# Patient Record
Sex: FEMALE | Race: WHITE | Employment: UNEMPLOYED | URBAN - METROPOLITAN AREA
[De-identification: names, ages, dates, MRNs, and addresses within clinical notes are randomized per-mention and may not be internally consistent; named-entity substitution may affect disease eponyms.]

---

## 2017-03-16 ENCOUNTER — OFFICE VISIT (OUTPATIENT)
Dept: FAMILY MEDICINE CLINIC | Facility: CLINIC | Age: 32
End: 2017-03-16

## 2017-03-16 VITALS
TEMPERATURE: 99 F | WEIGHT: 127.19 LBS | DIASTOLIC BLOOD PRESSURE: 64 MMHG | HEART RATE: 60 BPM | SYSTOLIC BLOOD PRESSURE: 106 MMHG | RESPIRATION RATE: 14 BRPM | BODY MASS INDEX: 19.96 KG/M2 | HEIGHT: 67 IN

## 2017-03-16 DIAGNOSIS — Z00.00 GENERAL MEDICAL EXAM: Primary | ICD-10-CM

## 2017-03-16 DIAGNOSIS — Z80.8 FAMILY HISTORY OF SKIN CANCER: ICD-10-CM

## 2017-03-16 PROCEDURE — 99395 PREV VISIT EST AGE 18-39: CPT | Performed by: FAMILY MEDICINE

## 2017-03-16 PROCEDURE — G0438 PPPS, INITIAL VISIT: HCPCS | Performed by: FAMILY MEDICINE

## 2017-03-16 NOTE — PROGRESS NOTES
HPI:   Pal Cummings is a 34year old female who presents for a complete physical exam. Sees gynecology for all breast and  care. There is no immunization history on file for this patient.   Wt Readings from Last 6 Encounters:  03/16/17 : heartburn  : denies dysuria, vaginal discharge or itching,periods regular   MUSCULOSKELETAL: denies back pain  NEURO: denies headaches  PSYCHE: denies depression or anxiety  HEMATOLOGIC: denies hx of anemia  ENDOCRINE: denies thyroid history  ALL/ASTHMA:

## 2017-03-18 ENCOUNTER — NURSE ONLY (OUTPATIENT)
Dept: FAMILY MEDICINE CLINIC | Facility: CLINIC | Age: 32
End: 2017-03-18

## 2017-03-18 DIAGNOSIS — Z00.00 GENERAL MEDICAL EXAM: ICD-10-CM

## 2017-03-18 LAB
25-HYDROXYVITAMIN D (TOTAL): 27.3 NG/ML (ref 30–100)
ALBUMIN SERPL-MCNC: 3.9 G/DL (ref 3.5–4.8)
ALP LIVER SERPL-CCNC: 39 U/L (ref 37–98)
ALT SERPL-CCNC: 17 U/L (ref 14–54)
AST SERPL-CCNC: 13 U/L (ref 15–41)
BASOPHILS # BLD AUTO: 0.03 X10(3) UL (ref 0–0.1)
BASOPHILS NFR BLD AUTO: 0.6 %
BILIRUB SERPL-MCNC: 1.8 MG/DL (ref 0.1–2)
BUN BLD-MCNC: 16 MG/DL (ref 8–20)
CALCIUM BLD-MCNC: 8.8 MG/DL (ref 8.3–10.3)
CHLORIDE: 106 MMOL/L (ref 101–111)
CHOLEST SMN-MCNC: 114 MG/DL (ref ?–200)
CO2: 29 MMOL/L (ref 22–32)
CREAT BLD-MCNC: 0.79 MG/DL (ref 0.55–1.02)
EOSINOPHIL # BLD AUTO: 0.12 X10(3) UL (ref 0–0.3)
EOSINOPHIL NFR BLD AUTO: 2.4 %
ERYTHROCYTE [DISTWIDTH] IN BLOOD BY AUTOMATED COUNT: 13.3 % (ref 11.5–16)
GLUCOSE BLD-MCNC: 80 MG/DL (ref 70–99)
HCT VFR BLD AUTO: 35.7 % (ref 34–50)
HDLC SERPL-MCNC: 52 MG/DL (ref 45–?)
HDLC SERPL: 2.19 {RATIO} (ref ?–4.44)
HGB BLD-MCNC: 12.5 G/DL (ref 12–16)
IMMATURE GRANULOCYTE COUNT: 0.02 X10(3) UL (ref 0–1)
IMMATURE GRANULOCYTE RATIO %: 0.4 %
LDLC SERPL CALC-MCNC: 51 MG/DL (ref ?–130)
LYMPHOCYTES # BLD AUTO: 1.62 X10(3) UL (ref 0.9–4)
LYMPHOCYTES NFR BLD AUTO: 32.9 %
M PROTEIN MFR SERPL ELPH: 6.5 G/DL (ref 6.1–8.3)
MCH RBC QN AUTO: 30.1 PG (ref 27–33.2)
MCHC RBC AUTO-ENTMCNC: 35 G/DL (ref 31–37)
MCV RBC AUTO: 86 FL (ref 81–100)
MONOCYTES # BLD AUTO: 0.34 X10(3) UL (ref 0.1–0.6)
MONOCYTES NFR BLD AUTO: 6.9 %
NEUTROPHIL ABS PRELIM: 2.79 X10 (3) UL (ref 1.3–6.7)
NEUTROPHILS # BLD AUTO: 2.79 X10(3) UL (ref 1.3–6.7)
NEUTROPHILS NFR BLD AUTO: 56.8 %
NONHDLC SERPL-MCNC: 62 MG/DL (ref ?–130)
PLATELET # BLD AUTO: 150 10(3)UL (ref 150–450)
POTASSIUM SERPL-SCNC: 3.9 MMOL/L (ref 3.6–5.1)
RBC # BLD AUTO: 4.15 X10(6)UL (ref 3.8–5.1)
RED CELL DISTRIBUTION WIDTH-SD: 41.3 FL (ref 35.1–46.3)
SODIUM SERPL-SCNC: 140 MMOL/L (ref 136–144)
TRIGLYCERIDES: 57 MG/DL (ref ?–150)
TSI SER-ACNC: 0.54 MIU/ML (ref 0.35–5.5)
VLDL: 11 MG/DL (ref 5–40)
WBC # BLD AUTO: 4.9 X10(3) UL (ref 4–13)

## 2017-03-18 PROCEDURE — 85025 COMPLETE CBC W/AUTO DIFF WBC: CPT | Performed by: FAMILY MEDICINE

## 2017-03-18 PROCEDURE — 80053 COMPREHEN METABOLIC PANEL: CPT | Performed by: FAMILY MEDICINE

## 2017-03-18 PROCEDURE — 84443 ASSAY THYROID STIM HORMONE: CPT | Performed by: FAMILY MEDICINE

## 2017-03-18 PROCEDURE — 82306 VITAMIN D 25 HYDROXY: CPT | Performed by: FAMILY MEDICINE

## 2017-03-18 PROCEDURE — 36415 COLL VENOUS BLD VENIPUNCTURE: CPT | Performed by: FAMILY MEDICINE

## 2017-03-18 PROCEDURE — 80061 LIPID PANEL: CPT | Performed by: FAMILY MEDICINE

## 2017-04-26 ENCOUNTER — HOSPITAL ENCOUNTER (OUTPATIENT)
Age: 32
Discharge: HOME | End: 2017-04-26
Attending: FAMILY MEDICINE
Payer: COMMERCIAL

## 2017-04-26 VITALS
OXYGEN SATURATION: 100 % | HEIGHT: 67 IN | RESPIRATION RATE: 16 BRPM | WEIGHT: 125 LBS | TEMPERATURE: 98 F | DIASTOLIC BLOOD PRESSURE: 64 MMHG | SYSTOLIC BLOOD PRESSURE: 99 MMHG | BODY MASS INDEX: 19.62 KG/M2 | HEART RATE: 80 BPM

## 2017-04-26 DIAGNOSIS — J04.0 ACUTE LARYNGITIS: ICD-10-CM

## 2017-04-26 DIAGNOSIS — J02.9 TONSILLOPHARYNGITIS: Primary | ICD-10-CM

## 2017-04-26 PROCEDURE — 99214 OFFICE O/P EST MOD 30 MIN: CPT

## 2017-04-26 PROCEDURE — 87081 CULTURE SCREEN ONLY: CPT | Performed by: FAMILY MEDICINE

## 2017-04-26 PROCEDURE — 87430 STREP A AG IA: CPT | Performed by: FAMILY MEDICINE

## 2017-04-26 RX ORDER — AMOXICILLIN AND CLAVULANATE POTASSIUM 875; 125 MG/1; MG/1
1 TABLET, FILM COATED ORAL 2 TIMES DAILY
Qty: 20 TABLET | Refills: 0 | Status: SHIPPED | OUTPATIENT
Start: 2017-04-26 | End: 2017-05-06

## 2017-04-26 NOTE — ED INITIAL ASSESSMENT (HPI)
Patient has had a sore throat for 7 days. Two days ago she lost her voice. No fevers. No congestion.

## 2017-04-26 NOTE — ED PROVIDER NOTES
Patient Seen in: [address] [facility] [place] [facility]    History   Patient presents with:  Sore Throat  Laryngitis    Stated Complaint: SORE THROAT X 7 DAYS    HPI    34-year-old female coming in with complaints of sore throat for the last 7 days, 2 days is l Temporal   SpO2 04/26/17 0836 100 %   O2 Device 04/26/17 0836 None (Room air)       Current:BP 99/64 mmHg  Pulse 80  Temp(Src) 98.1 °F (36.7 °C) (Temporal)  Resp 16  Ht 170.2 cm (5' 7\")  Wt 56.7 kg  BMI 19.57 kg/m2  SpO2 100%  LMP 04/02/2017 (Exact Date) complications and if so please go to the ER       Disposition and Plan     Clinical Impression:  Tonsillopharyngitis  (primary encounter diagnosis)  Acute laryngitis    Disposition:  Discharge    Follow-up:  Tao Edmond, [address] [address]

## 2017-07-02 ENCOUNTER — HOSPITAL ENCOUNTER (OUTPATIENT)
Age: 32
Discharge: HOME | End: 2017-07-02
Attending: FAMILY MEDICINE
Payer: COMMERCIAL

## 2017-07-02 VITALS
HEART RATE: 75 BPM | DIASTOLIC BLOOD PRESSURE: 98 MMHG | SYSTOLIC BLOOD PRESSURE: 127 MMHG | OXYGEN SATURATION: 100 % | RESPIRATION RATE: 16 BRPM | TEMPERATURE: 98 F

## 2017-07-02 DIAGNOSIS — R39.9 UTI SYMPTOMS: Primary | ICD-10-CM

## 2017-07-02 LAB
POCT BILIRUBIN URINE: NEGATIVE
POCT BLOOD URINE: NEGATIVE
POCT GLUCOSE URINE: NEGATIVE MG/DL
POCT KETONE URINE: NEGATIVE MG/DL
POCT NITRITE URINE: NEGATIVE
POCT PH URINE: 5.5 (ref 5–8)
POCT PROTEIN URINE: NEGATIVE MG/DL
POCT SPECIFIC GRAVITY URINE: 1.01
POCT URINE CLARITY: CLEAR
POCT URINE COLOR: YELLOW
POCT URINE PREGNANCY: NEGATIVE
POCT UROBILINOGEN URINE: 0.2 MG/DL

## 2017-07-02 PROCEDURE — 87086 URINE CULTURE/COLONY COUNT: CPT | Performed by: FAMILY MEDICINE

## 2017-07-02 PROCEDURE — 81002 URINALYSIS NONAUTO W/O SCOPE: CPT | Performed by: FAMILY MEDICINE

## 2017-07-02 PROCEDURE — 81025 URINE PREGNANCY TEST: CPT | Performed by: FAMILY MEDICINE

## 2017-07-02 PROCEDURE — 99214 OFFICE O/P EST MOD 30 MIN: CPT

## 2017-07-02 RX ORDER — NITROFURANTOIN 25; 75 MG/1; MG/1
100 CAPSULE ORAL 2 TIMES DAILY
Qty: 14 CAPSULE | Refills: 0 | Status: SHIPPED | OUTPATIENT
Start: 2017-07-02 | End: 2017-07-05

## 2017-07-02 RX ORDER — PHENAZOPYRIDINE HYDROCHLORIDE 200 MG/1
200 TABLET, FILM COATED ORAL 3 TIMES DAILY PRN
Qty: 6 TABLET | Refills: 0 | Status: SHIPPED | OUTPATIENT
Start: 2017-07-02 | End: 2017-07-04

## 2017-07-02 NOTE — ED PROVIDER NOTES
[place] [address] [facility] [place]   Patient presents with:  Urinary Symptoms (urologic)    Stated Complaint: painful urination    HPI    48-year-old female presents to clinic today with chief complaints of dysuria and suprapubic reviewed from today and agreed except as otherwise stated in HPI.     Physical Exam   ED Triage Vitals [07/02/17 1404]  BP: 127/98  Pulse: 75  Resp: 16  Temp: (!) 97.5 °F (36.4 °C)  Temp src: Temporal  SpO2: 100 %  O2 Device: None (Room air)    Current:BP 1 Clinical Impression:  UTI symptoms  (primary encounter diagnosis)    Disposition:  Discharge    Follow-up:  Gemini Valle, [address] [place] [address]  [phone]    In 1 week  For re-check      Medications Prescribed:  Current Discharge

## 2017-07-02 NOTE — ED INITIAL ASSESSMENT (HPI)
Painful urination and pressure for the past two days. Concerned with UTI. Burning with urination as well.

## 2017-09-19 ENCOUNTER — OFFICE VISIT (OUTPATIENT)
Dept: FAMILY MEDICINE CLINIC | Facility: CLINIC | Age: 32
End: 2017-09-19

## 2017-09-19 VITALS
OXYGEN SATURATION: 98 % | SYSTOLIC BLOOD PRESSURE: 110 MMHG | BODY MASS INDEX: 21 KG/M2 | HEART RATE: 92 BPM | TEMPERATURE: 99 F | DIASTOLIC BLOOD PRESSURE: 70 MMHG | WEIGHT: 128.31 LBS

## 2017-09-19 DIAGNOSIS — R39.15 URINARY URGENCY: ICD-10-CM

## 2017-09-19 DIAGNOSIS — R30.9 URINARY PAIN: Primary | ICD-10-CM

## 2017-09-19 LAB
BILIRUBIN: NEGATIVE
GLUCOSE (URINE DIPSTICK): NEGATIVE MG/DL
KETONES (URINE DIPSTICK): NEGATIVE MG/DL
LEUKOCYTES: NEGATIVE
MULTISTIX LOT#: NORMAL NUMERIC
NITRITE, URINE: NEGATIVE
OCCULT BLOOD: NEGATIVE
PH, URINE: 7 (ref 4.5–8)
PROTEIN (URINE DIPSTICK): NEGATIVE MG/DL
SPECIFIC GRAVITY: 1.02 (ref 1–1.03)
URINE-COLOR: YELLOW
UROBILINOGEN,SEMI-QN: 0.2 MG/DL (ref 0–1.9)

## 2017-09-19 PROCEDURE — 81003 URINALYSIS AUTO W/O SCOPE: CPT | Performed by: FAMILY MEDICINE

## 2017-09-19 PROCEDURE — 99213 OFFICE O/P EST LOW 20 MIN: CPT | Performed by: FAMILY MEDICINE

## 2017-09-19 NOTE — PROGRESS NOTES
CC: urinary/gu symptoms    HPI:     Pt had recent dysuria and pelvic/vaginal pressure burning. Went to IC, tx with abx and cx negative abx stopped. Went to gynecologist but nothing found on pelvic u/s or vaginal testing. This is intermittent for 5 months.

## 2017-09-21 ENCOUNTER — TELEPHONE (OUTPATIENT)
Dept: FAMILY MEDICINE CLINIC | Facility: CLINIC | Age: 32
End: 2017-09-21

## 2017-09-21 NOTE — TELEPHONE ENCOUNTER
Pt called and made and appt with the Urogynecology but the code for her insurance needs to be changed on the referral   Code is [id number]  Please return call to [phone]

## 2017-10-31 ENCOUNTER — OFFICE VISIT (OUTPATIENT)
Dept: UROLOGY | Facility: HOSPITAL | Age: 32
End: 2017-10-31
Attending: OBSTETRICS & GYNECOLOGY
Payer: COMMERCIAL

## 2017-10-31 VITALS
SYSTOLIC BLOOD PRESSURE: 116 MMHG | WEIGHT: 128 LBS | BODY MASS INDEX: 20.57 KG/M2 | DIASTOLIC BLOOD PRESSURE: 70 MMHG | HEIGHT: 66 IN

## 2017-10-31 DIAGNOSIS — M62.838 LEVATOR SPASM: ICD-10-CM

## 2017-10-31 DIAGNOSIS — R35.0 FREQUENCY OF URINATION: ICD-10-CM

## 2017-10-31 DIAGNOSIS — N81.6 RECTOCELE: ICD-10-CM

## 2017-10-31 DIAGNOSIS — N81.84 PELVIC MUSCLE WASTING: Primary | ICD-10-CM

## 2017-10-31 PROCEDURE — 87086 URINE CULTURE/COLONY COUNT: CPT | Performed by: OBSTETRICS & GYNECOLOGY

## 2017-10-31 PROCEDURE — 81002 URINALYSIS NONAUTO W/O SCOPE: CPT

## 2017-10-31 PROCEDURE — 99201 HC OUTPT EVAL AND MGNT NEW PT LEVEL 1: CPT

## 2017-10-31 NOTE — PROGRESS NOTES
Huey Wild,   10/31/2017     Referred by Dr. Liz Pryor    Patient presents with:  Pelvic Pain: Referred by Dr Frank Carreno. itching, burning before period,pressure and pain with intercourse.     She reports vaginal itching, pelvic pressure, suprapubic CP  No SOB  No s/sx of UTI today    GENERAL EXAM:  GENERAL:  Alert and Oriented, and NAD  HEENT:  Normal, no lesions  LUNGS:  Normal effort  HEART:  RRR  ABDOMEN: soft, no mass, no hernia  EXTREM:  Normal, no edema  SKIN:  Normal, no lesions    PELVIC EXAM Items:  urine testing    Medications Discussed:  OAB meds    Treatment Plan, Non-surgical:   bladder diet/drill, pelvic floor PT    Treatment Plan, Surgical:   None    Pt verbalizes understanding of all above discussed information    Return in about 3 artur

## 2017-11-02 ENCOUNTER — TELEPHONE (OUTPATIENT)
Dept: UROLOGY | Facility: HOSPITAL | Age: 32
End: 2017-11-02

## 2018-03-13 ENCOUNTER — TELEPHONE (OUTPATIENT)
Dept: FAMILY MEDICINE CLINIC | Facility: CLINIC | Age: 33
End: 2018-03-13

## 2018-03-13 NOTE — TELEPHONE ENCOUNTER
Pt wants to discuss lab order. Current specialist does not take insurance. Can Dr. Ifeoma Tapia put in order for these so they go thru insurance or does she need appointment? Mauricio Nguyen, hormone specialist she sees.

## 2018-06-15 ENCOUNTER — OFFICE VISIT (OUTPATIENT)
Dept: FAMILY MEDICINE CLINIC | Facility: CLINIC | Age: 33
End: 2018-06-15

## 2018-06-15 VITALS
RESPIRATION RATE: 20 BRPM | DIASTOLIC BLOOD PRESSURE: 64 MMHG | HEIGHT: 66.9 IN | BODY MASS INDEX: 20.31 KG/M2 | HEART RATE: 92 BPM | TEMPERATURE: 97 F | SYSTOLIC BLOOD PRESSURE: 100 MMHG | WEIGHT: 129.38 LBS

## 2018-06-15 DIAGNOSIS — Z00.00 GENERAL MEDICAL EXAM: Primary | ICD-10-CM

## 2018-06-15 PROCEDURE — 99395 PREV VISIT EST AGE 18-39: CPT | Performed by: FAMILY MEDICINE

## 2018-06-15 NOTE — PROGRESS NOTES
HPI:   Stephanie Cortés is a 28year old female who presents for a complete physical exam. Sees Dr. Pedro Guerra for all breast and  care. Gets some Raynauds like symptoms in the hands and feet.       There is no immunization history on file for t with exertion  CARDIOVASCULAR: denies chest pain on exertion  GI: denies abdominal pain,denies heartburn  : denies dysuria, vaginal discharge or itching,periods regular   MUSCULOSKELETAL: denies back pain  NEURO: denies headaches  PSYCHE: anxiety better

## 2018-06-19 ENCOUNTER — NURSE ONLY (OUTPATIENT)
Dept: FAMILY MEDICINE CLINIC | Facility: CLINIC | Age: 33
End: 2018-06-19

## 2018-06-19 DIAGNOSIS — Z00.00 GENERAL MEDICAL EXAM: Primary | ICD-10-CM

## 2018-06-19 PROCEDURE — 80061 LIPID PANEL: CPT | Performed by: FAMILY MEDICINE

## 2018-06-19 PROCEDURE — 80053 COMPREHEN METABOLIC PANEL: CPT | Performed by: FAMILY MEDICINE

## 2018-06-19 PROCEDURE — 82306 VITAMIN D 25 HYDROXY: CPT | Performed by: FAMILY MEDICINE

## 2018-06-19 PROCEDURE — 36415 COLL VENOUS BLD VENIPUNCTURE: CPT | Performed by: FAMILY MEDICINE

## 2018-06-19 PROCEDURE — 85025 COMPLETE CBC W/AUTO DIFF WBC: CPT | Performed by: FAMILY MEDICINE

## 2018-06-19 PROCEDURE — 84443 ASSAY THYROID STIM HORMONE: CPT | Performed by: FAMILY MEDICINE

## 2018-08-15 ENCOUNTER — LAB ENCOUNTER (OUTPATIENT)
Dept: LAB | Age: 33
End: 2018-08-15
Attending: OBSTETRICS & GYNECOLOGY
Payer: COMMERCIAL

## 2018-08-15 DIAGNOSIS — N94.3 PMS (PREMENSTRUAL SYNDROME): ICD-10-CM

## 2018-08-15 DIAGNOSIS — R45.86 MOOD CHANGES: ICD-10-CM

## 2018-08-15 LAB
ESTRADIOL: 73.9 PG/ML
FSH SERPL-ACNC: 7.1 MIU/ML
PROLACTIN: 4.3 NG/ML
TSI SER-ACNC: 0.67 MIU/ML (ref 0.35–5.5)

## 2018-08-15 PROCEDURE — 83001 ASSAY OF GONADOTROPIN (FSH): CPT

## 2018-08-15 PROCEDURE — 84146 ASSAY OF PROLACTIN: CPT

## 2018-08-15 PROCEDURE — 84443 ASSAY THYROID STIM HORMONE: CPT

## 2018-08-15 PROCEDURE — 36415 COLL VENOUS BLD VENIPUNCTURE: CPT

## 2018-08-15 PROCEDURE — 82670 ASSAY OF TOTAL ESTRADIOL: CPT

## 2018-10-22 ENCOUNTER — HOSPITAL ENCOUNTER (OUTPATIENT)
Dept: MAMMOGRAPHY | Age: 33
Discharge: HOME | End: 2018-10-22
Attending: OBSTETRICS & GYNECOLOGY
Payer: COMMERCIAL

## 2018-10-22 ENCOUNTER — HOSPITAL ENCOUNTER (OUTPATIENT)
Dept: ULTRASOUND IMAGING | Age: 33
Discharge: HOME | End: 2018-10-22
Attending: OBSTETRICS & GYNECOLOGY
Payer: COMMERCIAL

## 2018-10-22 DIAGNOSIS — N64.4 BREAST PAIN: ICD-10-CM

## 2018-10-22 PROCEDURE — 76641 ULTRASOUND BREAST COMPLETE: CPT | Performed by: OBSTETRICS & GYNECOLOGY

## 2018-10-22 PROCEDURE — 77066 DX MAMMO INCL CAD BI: CPT | Performed by: OBSTETRICS & GYNECOLOGY

## 2018-10-22 PROCEDURE — 77062 BREAST TOMOSYNTHESIS BI: CPT | Performed by: OBSTETRICS & GYNECOLOGY

## 2019-02-12 ENCOUNTER — OFFICE VISIT (OUTPATIENT)
Dept: OBGYN CLINIC | Facility: CLINIC | Age: 34
End: 2019-02-12

## 2019-02-12 VITALS
HEIGHT: 65.25 IN | DIASTOLIC BLOOD PRESSURE: 68 MMHG | WEIGHT: 129 LBS | SYSTOLIC BLOOD PRESSURE: 110 MMHG | BODY MASS INDEX: 21.23 KG/M2

## 2019-02-12 DIAGNOSIS — N90.89 VULVAR IRRITATION: ICD-10-CM

## 2019-02-12 DIAGNOSIS — N64.4 BREAST PAIN: ICD-10-CM

## 2019-02-12 DIAGNOSIS — N94.3 PMS (PREMENSTRUAL SYNDROME): ICD-10-CM

## 2019-02-12 DIAGNOSIS — Z01.419 WELL FEMALE EXAM WITH ROUTINE GYNECOLOGICAL EXAM: Primary | ICD-10-CM

## 2019-02-12 DIAGNOSIS — Z12.4 CERVICAL CANCER SCREENING: ICD-10-CM

## 2019-02-12 DIAGNOSIS — R45.86 MOOD CHANGES: ICD-10-CM

## 2019-02-12 PROCEDURE — 88175 CYTOPATH C/V AUTO FLUID REDO: CPT | Performed by: OBSTETRICS & GYNECOLOGY

## 2019-02-12 PROCEDURE — 87624 HPV HI-RISK TYP POOLED RSLT: CPT | Performed by: OBSTETRICS & GYNECOLOGY

## 2019-02-12 PROCEDURE — 99385 PREV VISIT NEW AGE 18-39: CPT | Performed by: OBSTETRICS & GYNECOLOGY

## 2019-02-12 RX ORDER — NYSTATIN AND TRIAMCINOLONE ACETONIDE 100000; 1 [USP'U]/G; MG/G
OINTMENT TOPICAL
Qty: 30 G | Refills: 2 | Status: SHIPPED | OUTPATIENT
Start: 2019-02-12 | End: 2019-12-03

## 2019-02-12 NOTE — PROGRESS NOTES
GYN H&P     2019  3:00 PM    CC: Patient is here for annual and multiple issues    HPI: patient is a 28year old  here for her annual gyn exam.   She has issues with breast pain, vulvar pain, pelvic pressure, pms.  All have been evaluated by her Years of education: Not on file      Highest education level: Not on file    Social Needs      Financial resource strain: Not on file      Food insecurity - worry: Not on file      Food insecurity - inability: Not on file      Transportation needs - medica anxiety  Endocrine: denies hot or cold intolerance, mood changes,   Neurological: denies changes in sight, smell, hearing or taste.  Denies seizures or tremors  Immunological: denies allergies, denies anaphylaxis, or swollen lymph nodes  Musculoskeletal: de HIGH RISK , THIN PREP COLLECTION; Future  - HPV HIGH RISK , THIN PREP COLLECTION  - THINPREP PAP SMEAR B    3. PMS (premenstrual syndrome)  Info given on options    4. Breast pain  Had neg imaging lately, given Gresiks name if desires    5.  Vulvar irritati

## 2019-02-12 NOTE — PATIENT INSTRUCTIONS
Breast Health: Breast Self-Awareness  What is breast self-awareness? Breast self-awareness is knowing how your breasts normally look and feel. Your breasts change as you go through different stages of your life.  So it’s important to learn what is normal normal to be upset if you find a lump. But it’s important to contact your provider right away. Remember that most breast lumps are benign. This means they are not cancer. Date Last Reviewed: 8/1/2017  © 2945-3048 [employer].  [facility] source of energy. To help keep energy and serotonin levels steady, eat small amounts throughout the day. High-fiber carbs include:  · Whole-grain foods. Brown rice, whole-wheat pasta, whole-grain bread, and buckwheat noodles are good choices.   · Fresh frui swimming, or yoga. ? Exercise for 30 minutes, most days of the week. If this seems like too much, start with 10 minutes a day and work your way up. ? Find ways to fit activity into your day. Try taking the stairs instead of the elevator.   Sleeping well as a substitute for professional medical care. Always follow your healthcare professional's instructions. Managing PMS: Medications  With your healthcare provider’s help, you may find a medicine that works for you.  Make sure you know how to use your headache. SSRIs may also reduce interest in sex. This is less likely when SSRIs are taken only for part of the menstrual cycle. · Other concerns. SSRIs shouldn’t be used if you take certain antidepressants.   Guerraview agonists  Guerraview agonists may be used in sev

## 2019-02-13 LAB — HPV I/H RISK 1 DNA SPEC QL NAA+PROBE: NEGATIVE

## 2019-03-25 ENCOUNTER — HOSPITAL ENCOUNTER (OUTPATIENT)
Dept: GENERAL RADIOLOGY | Age: 34
Discharge: HOME | End: 2019-03-25
Attending: FAMILY MEDICINE
Payer: COMMERCIAL

## 2019-03-25 ENCOUNTER — OFFICE VISIT (OUTPATIENT)
Dept: FAMILY MEDICINE CLINIC | Facility: CLINIC | Age: 34
End: 2019-03-25

## 2019-03-25 VITALS
TEMPERATURE: 98 F | WEIGHT: 129.38 LBS | RESPIRATION RATE: 12 BRPM | SYSTOLIC BLOOD PRESSURE: 108 MMHG | HEART RATE: 84 BPM | DIASTOLIC BLOOD PRESSURE: 70 MMHG | OXYGEN SATURATION: 98 % | BODY MASS INDEX: 21 KG/M2

## 2019-03-25 DIAGNOSIS — M89.8X9 EXOSTOSIS: ICD-10-CM

## 2019-03-25 DIAGNOSIS — M89.8X9 EXOSTOSIS: Primary | ICD-10-CM

## 2019-03-25 PROCEDURE — 73560 X-RAY EXAM OF KNEE 1 OR 2: CPT | Performed by: FAMILY MEDICINE

## 2019-03-25 PROCEDURE — 99213 OFFICE O/P EST LOW 20 MIN: CPT | Performed by: FAMILY MEDICINE

## 2019-03-25 RX ORDER — CHOLECALCIFEROL (VITAMIN D3) 50 MCG
TABLET ORAL DAILY
COMMUNITY
End: 2019-12-03

## 2019-03-25 NOTE — PROGRESS NOTES
CC: knee    HPI:     Location left sided medial  Quality lump  Severity moderate  Duration several months  Context no known injury  Modifying factors worse at night  Associated symptoms no bruising    ROS: no edema    EXAM:   Blood pressure 108/70, pulse 8

## 2019-03-30 ENCOUNTER — PATIENT MESSAGE (OUTPATIENT)
Dept: FAMILY MEDICINE CLINIC | Facility: CLINIC | Age: 34
End: 2019-03-30

## 2019-03-30 DIAGNOSIS — M79.9 SOFT TISSUE LESION OF KNEE REGION: Primary | ICD-10-CM

## 2019-03-30 DIAGNOSIS — D49.2 NEOPLASM OF SKIN: ICD-10-CM

## 2019-03-30 NOTE — TELEPHONE ENCOUNTER
Jaclyn Ruffin RN 3/30/2019 10:06 AM CDT        ----- Message -----  From: Ligia Keating  Sent: 3/30/2019 10:02 AM  To: [facility] Clinical Staff  Subject: Era Terry     ----- Message from [address] [place] [address] [address] Generic sent at 3/30/2019 10:02 AM CDT -----    Barbara Jaimes  U

## 2019-04-08 ENCOUNTER — HOSPITAL ENCOUNTER (OUTPATIENT)
Dept: ULTRASOUND IMAGING | Facility: HOSPITAL | Age: 34
Discharge: HOME | End: 2019-04-08
Attending: FAMILY MEDICINE
Payer: COMMERCIAL

## 2019-04-08 DIAGNOSIS — M79.9 SOFT TISSUE LESION OF KNEE REGION: ICD-10-CM

## 2019-04-08 PROCEDURE — 76882 US LMTD JT/FCL EVL NVASC XTR: CPT | Performed by: FAMILY MEDICINE

## 2019-04-09 ENCOUNTER — TELEPHONE (OUTPATIENT)
Dept: FAMILY MEDICINE CLINIC | Facility: CLINIC | Age: 34
End: 2019-04-09

## 2019-04-09 DIAGNOSIS — M25.869 CYST OF KNEE JOINT, UNSPECIFIED LATERALITY: Primary | ICD-10-CM

## 2019-06-04 ENCOUNTER — HOSPITAL ENCOUNTER (OUTPATIENT)
Dept: MRI IMAGING | Age: 34
Discharge: HOME | End: 2019-06-04
Attending: ORTHOPAEDIC SURGERY
Payer: COMMERCIAL

## 2019-06-04 DIAGNOSIS — M25.562 ACUTE PAIN OF LEFT KNEE: ICD-10-CM

## 2019-06-04 PROCEDURE — 73721 MRI JNT OF LWR EXTRE W/O DYE: CPT | Performed by: ORTHOPAEDIC SURGERY

## 2019-08-05 PROBLEM — Z98.890 S/P ARTHROSCOPY OF LEFT KNEE: Status: ACTIVE | Noted: 2019-08-05

## 2019-12-02 ENCOUNTER — TELEPHONE (OUTPATIENT)
Dept: OBGYN CLINIC | Facility: CLINIC | Age: 34
End: 2019-12-02

## 2019-12-02 NOTE — TELEPHONE ENCOUNTER
27year old patient complaining of right breast pain. Has has this before; it went away for awhile, but now has returned over the last month or so. States it feels like a bruised pain. No change in self breast exam; no lumps. LMP: 2 wks ago.  Feels it goes

## 2019-12-03 ENCOUNTER — OFFICE VISIT (OUTPATIENT)
Dept: OBGYN CLINIC | Facility: CLINIC | Age: 34
End: 2019-12-03

## 2019-12-03 VITALS
SYSTOLIC BLOOD PRESSURE: 104 MMHG | WEIGHT: 131 LBS | DIASTOLIC BLOOD PRESSURE: 70 MMHG | HEART RATE: 76 BPM | BODY MASS INDEX: 21 KG/M2

## 2019-12-03 DIAGNOSIS — N64.4 BREAST PAIN: ICD-10-CM

## 2019-12-03 DIAGNOSIS — N92.6 IRREGULAR MENSES: Primary | ICD-10-CM

## 2019-12-03 DIAGNOSIS — N60.01 BREAST CYST, RIGHT: ICD-10-CM

## 2019-12-03 PROCEDURE — 99213 OFFICE O/P EST LOW 20 MIN: CPT | Performed by: NURSE PRACTITIONER

## 2019-12-03 RX ORDER — MULTIVITAMIN
TABLET ORAL
COMMUNITY

## 2019-12-03 NOTE — PROGRESS NOTES
Gyne note     S: patient is a 27year old yo  here for continued right sided breast pain. It is intermittent going on over a year. Last imaging revealed 2 small cysts and a lymph node all benign in appearance.  She notes lately she thinks her breast

## 2020-02-17 ENCOUNTER — TELEPHONE (OUTPATIENT)
Dept: FAMILY MEDICINE CLINIC | Facility: CLINIC | Age: 35
End: 2020-02-17

## 2020-02-17 ENCOUNTER — TELEPHONE (OUTPATIENT)
Dept: OBGYN CLINIC | Facility: CLINIC | Age: 35
End: 2020-02-17

## 2020-02-17 DIAGNOSIS — N64.4 PAIN OF BOTH BREASTS: Primary | ICD-10-CM

## 2020-02-17 DIAGNOSIS — D49.2 NEOPLASM OF SKIN: Primary | ICD-10-CM

## 2020-02-17 NOTE — TELEPHONE ENCOUNTER
Diagnostic US order requested/ possibly not just R side /     Please call to discuss or put in order for diagnostic US

## 2020-02-17 NOTE — TELEPHONE ENCOUNTER
LMTCB. Dermatology referral signed - she can schedule anytime. Please notify pt she is due for an annual exam with MY and schedule. Thank you.

## 2020-02-17 NOTE — TELEPHONE ENCOUNTER
Pt last seen 12/3/19; right mammogram was ordered for breast pain. Pt has not had done yet; one of her children is having some health issues.   Pt states she is now feeling some \"heaviness\" and discomfort on the left side as well; upper outer breast and

## 2020-02-19 NOTE — TELEPHONE ENCOUNTER
Order placed for bilateral diagnostic mammogram.  Patient notified. Patient verbalized understanding.

## 2020-03-06 ENCOUNTER — HOSPITAL ENCOUNTER (OUTPATIENT)
Dept: MAMMOGRAPHY | Facility: HOSPITAL | Age: 35
Discharge: HOME | End: 2020-03-06
Attending: NURSE PRACTITIONER
Payer: COMMERCIAL

## 2020-03-06 ENCOUNTER — HOSPITAL ENCOUNTER (OUTPATIENT)
Dept: MAMMOGRAPHY | Age: 35
Discharge: HOME | End: 2020-03-06
Attending: NURSE PRACTITIONER
Payer: COMMERCIAL

## 2020-03-06 ENCOUNTER — HOSPITAL ENCOUNTER (OUTPATIENT)
Dept: ULTRASOUND IMAGING | Age: 35
Discharge: HOME | End: 2020-03-06
Attending: NURSE PRACTITIONER
Payer: COMMERCIAL

## 2020-03-06 DIAGNOSIS — N64.4 PAIN OF BOTH BREASTS: ICD-10-CM

## 2020-03-06 PROCEDURE — 77066 DX MAMMO INCL CAD BI: CPT | Performed by: NURSE PRACTITIONER

## 2020-03-06 PROCEDURE — 77062 BREAST TOMOSYNTHESIS BI: CPT | Performed by: NURSE PRACTITIONER

## 2020-03-06 PROCEDURE — 76642 ULTRASOUND BREAST LIMITED: CPT | Performed by: NURSE PRACTITIONER

## 2020-03-10 ENCOUNTER — OFFICE VISIT (OUTPATIENT)
Dept: OBGYN CLINIC | Facility: CLINIC | Age: 35
End: 2020-03-10

## 2020-03-10 VITALS
SYSTOLIC BLOOD PRESSURE: 98 MMHG | DIASTOLIC BLOOD PRESSURE: 64 MMHG | WEIGHT: 130 LBS | HEIGHT: 66.25 IN | BODY MASS INDEX: 20.89 KG/M2

## 2020-03-10 DIAGNOSIS — Z01.419 WELL FEMALE EXAM WITH ROUTINE GYNECOLOGICAL EXAM: Primary | ICD-10-CM

## 2020-03-10 DIAGNOSIS — N64.4 PAIN OF BOTH BREASTS: ICD-10-CM

## 2020-03-10 DIAGNOSIS — Z12.4 CERVICAL CANCER SCREENING: ICD-10-CM

## 2020-03-10 PROCEDURE — 87624 HPV HI-RISK TYP POOLED RSLT: CPT | Performed by: OBSTETRICS & GYNECOLOGY

## 2020-03-10 PROCEDURE — 88175 CYTOPATH C/V AUTO FLUID REDO: CPT | Performed by: OBSTETRICS & GYNECOLOGY

## 2020-03-10 PROCEDURE — 99395 PREV VISIT EST AGE 18-39: CPT | Performed by: OBSTETRICS & GYNECOLOGY

## 2020-03-10 PROCEDURE — G0438 PPPS, INITIAL VISIT: HCPCS | Performed by: OBSTETRICS & GYNECOLOGY

## 2020-03-10 NOTE — PROGRESS NOTES
GYN H&P     3/10/2020  11:03 AM    CC: Patient is here for annual    HPI: patient is a 38year old  here for her annual gyn exam.   She has complaints of breast pain, all imaging negative.  Also has some vulvar discomfort but is using steroid cream o file    Occupational History      Not on file    Social Needs      Financial resource strain: Not on file      Food insecurity:        Worry: Not on file        Inability: Not on file      Transportation needs:        Medical: Not on file        Non-medica epistaxis, throat pain or sore throat  Respiratory: denies SOB, dyspnea, cough or wheezing  Cardiovascular: denies chest pain, palpitations, exercise intolerance   GI: denies abdominal pain, diarrhea, constipation  :complaints as above, denies dysuria, i edema        A/P: Patient is 38year old female with no complaints. Here for well woman exam.   1. Well female exam with routine gynecological exam    2. Cervical cancer screening    3.  Pain of both breasts        Patient counseled on diet/exercise       1

## 2020-03-11 LAB — HPV I/H RISK 1 DNA SPEC QL NAA+PROBE: NEGATIVE

## 2020-05-05 ENCOUNTER — VIRTUAL PHONE E/M (OUTPATIENT)
Dept: FAMILY MEDICINE CLINIC | Facility: CLINIC | Age: 35
End: 2020-05-05

## 2020-05-05 ENCOUNTER — TELEPHONE (OUTPATIENT)
Dept: FAMILY MEDICINE CLINIC | Facility: CLINIC | Age: 35
End: 2020-05-05

## 2020-05-05 DIAGNOSIS — N30.01 ACUTE CYSTITIS WITH HEMATURIA: Primary | ICD-10-CM

## 2020-05-05 PROCEDURE — 99213 OFFICE O/P EST LOW 20 MIN: CPT | Performed by: FAMILY MEDICINE

## 2020-05-05 RX ORDER — CIPROFLOXACIN 500 MG/1
500 TABLET, FILM COATED ORAL 2 TIMES DAILY
Qty: 10 TABLET | Refills: 0 | Status: SHIPPED | OUTPATIENT
Start: 2020-05-05 | End: 2020-05-10

## 2020-05-05 RX ORDER — PHENAZOPYRIDINE HYDROCHLORIDE 200 MG/1
200 TABLET, FILM COATED ORAL 3 TIMES DAILY PRN
Qty: 6 TABLET | Refills: 0 | Status: SHIPPED | OUTPATIENT
Start: 2020-05-05 | End: 2020-05-07

## 2020-05-05 NOTE — PROGRESS NOTES
Virtual Telephone Check-In    Jessica Singh verbally consents to a Virtual/Telephone Check-In visit on 05/05/20.     Patient understands and accepts financial responsibility for any deductible, co-insurance and/or co-pays associated with this serv

## 2020-05-05 NOTE — TELEPHONE ENCOUNTER
Patient's  advised. Verbalized understanding. Cristian Susy verbally consents to a Virtual/Telephone Check-In service on 5/5/20.   Patient understands and accepts financial responsibility for any deductible, co-insurance and/or co-pays

## 2020-05-05 NOTE — TELEPHONE ENCOUNTER
Pt  called, states pt has a UTI, used an AZO strip and it came back positive, pt has been in bed with a lot of pain, and has blood in her urine,  informed Spouse Dr Kerri Soares want to have a telemed visit.   Wants to know if MY can call in a RX

## 2020-05-05 NOTE — TELEPHONE ENCOUNTER
Call from spouse. UTI symptoms started 2 days ago. Pain in bladder area. Blood in urine. No back pain. Frequency for urination. No fever. Did at home Azo strips and they indicated a UTI. Took motrin for pain.  can bring in urine sample.    States

## 2020-05-06 ENCOUNTER — TELEPHONE (OUTPATIENT)
Dept: FAMILY MEDICINE CLINIC | Facility: CLINIC | Age: 35
End: 2020-05-06

## 2020-05-06 NOTE — TELEPHONE ENCOUNTER
Call from . Patient had tele visit yesterday for UTI. States patient feeling better overall today, except having possible reaction to Cirpo 500mg. States she has taken 2 doses. States she is nauseous, has headache and both of her arms ache.   No fev

## 2020-05-06 NOTE — TELEPHONE ENCOUNTER
Talked with patient. She reports no blood in urine and pressure like symptoms gone after drinking enough water before even taking cipro. No back pain no fever. She has nausea and headache and bodyache with cipro. No lip/tongue swelling. No sob. No hives.

## 2020-05-06 NOTE — TELEPHONE ENCOUNTER
Pt  called, states Dr Marquita Burris prescribed antibiotic for pt yesterday, and states she has a headache and is sick to her stomach and her arms are aching.

## 2020-05-08 ENCOUNTER — TELEPHONE (OUTPATIENT)
Dept: FAMILY MEDICINE CLINIC | Facility: CLINIC | Age: 35
End: 2020-05-08

## 2020-05-08 RX ORDER — SULFAMETHOXAZOLE AND TRIMETHOPRIM 800; 160 MG/1; MG/1
1 TABLET ORAL 2 TIMES DAILY
Qty: 14 TABLET | Refills: 0 | Status: SHIPPED | OUTPATIENT
Start: 2020-05-08 | End: 2020-05-15

## 2020-05-08 NOTE — TELEPHONE ENCOUNTER
Stopped taking the antibiotic, pt states she has been drinking more water, states she no longer is urinating blood, however,   Now she has pain after she urinates, Pt took an azo strip, states urinated white blood cells.   Please advise

## 2020-05-08 NOTE — TELEPHONE ENCOUNTER
Honorio Diggs, DO  You 1 minute ago (4:16 PM)     Call tell new rx sent. Try that if she thinks she still has infection.

## 2020-05-08 NOTE — TELEPHONE ENCOUNTER
Spoke to patient. Had telephone visit on 5/5/2 for hematuria. Was put on cipro but had to stop taking on 5/6/20 due to muscle cramping, headache, and dizziness. Patient has been drinking a lot of water.    No longer has blood in urine but it burns after

## 2020-05-21 ENCOUNTER — TELEPHONE (OUTPATIENT)
Dept: FAMILY MEDICINE CLINIC | Facility: CLINIC | Age: 35
End: 2020-05-21

## 2020-05-21 DIAGNOSIS — R30.9 URINARY PAIN: Primary | ICD-10-CM

## 2020-05-21 NOTE — TELEPHONE ENCOUNTER
Call tell stop testing herself. Only if symptoms develop, then we will see her in the office for testing.

## 2020-05-22 NOTE — TELEPHONE ENCOUNTER
Spoke to patient. Patient advised. Verbalized understanding. Patient keeps having a dull pressure after she urinates that happens intermittently. She is wondering if she should see urogynecologist again?  She saw Dr. Isai Lopez a couple years ago and was

## 2020-05-28 ENCOUNTER — PATIENT OUTREACH (OUTPATIENT)
Dept: FAMILY MEDICINE CLINIC | Facility: CLINIC | Age: 35
End: 2020-05-28

## 2020-08-31 ENCOUNTER — LAB ENCOUNTER (OUTPATIENT)
Dept: LAB | Age: 35
End: 2020-08-31
Attending: DERMATOLOGY
Payer: COMMERCIAL

## 2020-08-31 DIAGNOSIS — D48.5 NEOPLASM OF UNCERTAIN BEHAVIOR OF SKIN: ICD-10-CM

## 2020-08-31 PROCEDURE — 88305 TISSUE EXAM BY PATHOLOGIST: CPT

## 2020-11-06 ENCOUNTER — TELEPHONE (OUTPATIENT)
Dept: FAMILY MEDICINE CLINIC | Facility: CLINIC | Age: 35
End: 2020-11-06

## 2020-11-06 NOTE — TELEPHONE ENCOUNTER
Pt  called stating that she has been experiencing jaw pain for the past 2 days and wants to know if she should go to a dentist or the dr   Please advise

## 2020-11-06 NOTE — TELEPHONE ENCOUNTER
Per patient's  -  is listed on consent.   Symptoms started 2 weeks ago  Feels like gums are burning  Jaw hurts when she chews  No abscess in mouth  Mouth and cheeks are swollen  No fever  No difficulty breathing/no SOB  Gums are not bleeding

## 2021-01-01 ENCOUNTER — EXTERNAL RECORD (OUTPATIENT)
Dept: HEALTH INFORMATION MANAGEMENT | Facility: OTHER | Age: 36
End: 2021-01-01

## 2021-03-19 ENCOUNTER — OFFICE VISIT (OUTPATIENT)
Dept: FAMILY MEDICINE CLINIC | Facility: CLINIC | Age: 36
End: 2021-03-19

## 2021-03-19 ENCOUNTER — OFFICE VISIT (OUTPATIENT)
Dept: OBGYN CLINIC | Facility: CLINIC | Age: 36
End: 2021-03-19

## 2021-03-19 VITALS
WEIGHT: 133 LBS | TEMPERATURE: 99 F | OXYGEN SATURATION: 98 % | BODY MASS INDEX: 21.89 KG/M2 | SYSTOLIC BLOOD PRESSURE: 122 MMHG | RESPIRATION RATE: 16 BRPM | DIASTOLIC BLOOD PRESSURE: 82 MMHG | HEIGHT: 65.5 IN | HEART RATE: 105 BPM

## 2021-03-19 VITALS
DIASTOLIC BLOOD PRESSURE: 76 MMHG | WEIGHT: 134.19 LBS | HEIGHT: 65.5 IN | SYSTOLIC BLOOD PRESSURE: 104 MMHG | HEART RATE: 92 BPM | BODY MASS INDEX: 22.09 KG/M2

## 2021-03-19 DIAGNOSIS — Z12.4 CERVICAL CANCER SCREENING: ICD-10-CM

## 2021-03-19 DIAGNOSIS — N64.4 BREAST PAIN, RIGHT: ICD-10-CM

## 2021-03-19 DIAGNOSIS — Z01.419 WELL FEMALE EXAM WITH ROUTINE GYNECOLOGICAL EXAM: Primary | ICD-10-CM

## 2021-03-19 DIAGNOSIS — Z00.00 ANNUAL PHYSICAL EXAM: Primary | ICD-10-CM

## 2021-03-19 DIAGNOSIS — N94.3 PMS (PREMENSTRUAL SYNDROME): ICD-10-CM

## 2021-03-19 DIAGNOSIS — N76.2 ACUTE VULVITIS: ICD-10-CM

## 2021-03-19 PROCEDURE — 87624 HPV HI-RISK TYP POOLED RSLT: CPT | Performed by: NURSE PRACTITIONER

## 2021-03-19 PROCEDURE — 99395 PREV VISIT EST AGE 18-39: CPT | Performed by: FAMILY MEDICINE

## 2021-03-19 PROCEDURE — 3079F DIAST BP 80-89 MM HG: CPT | Performed by: FAMILY MEDICINE

## 2021-03-19 PROCEDURE — 3074F SYST BP LT 130 MM HG: CPT | Performed by: NURSE PRACTITIONER

## 2021-03-19 PROCEDURE — 3008F BODY MASS INDEX DOCD: CPT | Performed by: NURSE PRACTITIONER

## 2021-03-19 PROCEDURE — 3074F SYST BP LT 130 MM HG: CPT | Performed by: FAMILY MEDICINE

## 2021-03-19 PROCEDURE — G0438 PPPS, INITIAL VISIT: HCPCS | Performed by: NURSE PRACTITIONER

## 2021-03-19 PROCEDURE — 88175 CYTOPATH C/V AUTO FLUID REDO: CPT | Performed by: NURSE PRACTITIONER

## 2021-03-19 PROCEDURE — 3008F BODY MASS INDEX DOCD: CPT | Performed by: FAMILY MEDICINE

## 2021-03-19 PROCEDURE — 99395 PREV VISIT EST AGE 18-39: CPT | Performed by: NURSE PRACTITIONER

## 2021-03-19 PROCEDURE — 3078F DIAST BP <80 MM HG: CPT | Performed by: NURSE PRACTITIONER

## 2021-03-19 NOTE — PROGRESS NOTES
Here for Routine Annual Exam  Noting worsening PMS symptoms- mood changes, breast pain which lasts from ovulation until menses with only a few good days in between. Menses are normal, no concerns.   Contraception- SO had a vasectomy  No C/O- still havi

## 2021-03-19 NOTE — PATIENT INSTRUCTIONS
Evening Primrose    Chasteberry (Vitex)    Make sure cardiology is OK with the herbs    Vit D3, 0149-1109 daily    Magnesium Glycinate 400 nightly

## 2021-03-21 NOTE — PROGRESS NOTES
HPI:   Leyda Arellano is a 39year old female who presents for a complete physical exam.   No concerns today    Pap: UTD  Has gyne: yes      Wt Readings from Last 6 Encounters:  03/19/21 : 133 lb (60.3 kg)  03/19/21 : 134 lb 3.2 oz (60.9 kg)  03/10/20 Never used    Alcohol use: No      Alcohol/week: 0.0 standard drinks      Comment: occ    Drug use: No    Occ: no  : yes Children: yes  Exercise: walk  Diet: balanced     REVIEW OF SYSTEMS:   GENERAL: feels well otherwise  SKIN: denies any unusual s

## 2021-03-25 ENCOUNTER — TELEPHONE (OUTPATIENT)
Dept: FAMILY MEDICINE CLINIC | Facility: CLINIC | Age: 36
End: 2021-03-25

## 2021-03-25 DIAGNOSIS — D49.2 NEOPLASM OF SKIN: Primary | ICD-10-CM

## 2021-03-25 LAB — HPV I/H RISK 1 DNA SPEC QL NAA+PROBE: NEGATIVE

## 2021-03-25 NOTE — TELEPHONE ENCOUNTER
Confirmed Provider with patient. Referral for Dr. Ja Lira placed. Patient notified and verbalized understanding.

## 2021-03-29 ENCOUNTER — HOSPITAL ENCOUNTER (OUTPATIENT)
Dept: MAMMOGRAPHY | Age: 36
Discharge: HOME | End: 2021-03-29
Attending: NURSE PRACTITIONER
Payer: COMMERCIAL

## 2021-03-29 ENCOUNTER — HOSPITAL ENCOUNTER (OUTPATIENT)
Dept: ULTRASOUND IMAGING | Age: 36
Discharge: HOME | End: 2021-03-29
Attending: NURSE PRACTITIONER
Payer: COMMERCIAL

## 2021-03-29 DIAGNOSIS — N64.4 BREAST PAIN, RIGHT: ICD-10-CM

## 2021-03-29 PROCEDURE — 76642 ULTRASOUND BREAST LIMITED: CPT | Performed by: NURSE PRACTITIONER

## 2021-03-29 PROCEDURE — 77065 DX MAMMO INCL CAD UNI: CPT | Performed by: NURSE PRACTITIONER

## 2021-03-29 PROCEDURE — 77061 BREAST TOMOSYNTHESIS UNI: CPT | Performed by: NURSE PRACTITIONER

## 2021-03-30 ENCOUNTER — NURSE ONLY (OUTPATIENT)
Dept: FAMILY MEDICINE CLINIC | Facility: CLINIC | Age: 36
End: 2021-03-30

## 2021-03-30 DIAGNOSIS — Z00.00 ANNUAL PHYSICAL EXAM: ICD-10-CM

## 2021-03-30 LAB
ALBUMIN SERPL-MCNC: 4 G/DL (ref 3.4–5)
ALBUMIN/GLOB SERPL: 1.4 {RATIO} (ref 1–2)
ALP LIVER SERPL-CCNC: 41 U/L
ALT SERPL-CCNC: 21 U/L
ANION GAP SERPL CALC-SCNC: 4 MMOL/L (ref 0–18)
AST SERPL-CCNC: 10 U/L (ref 15–37)
BASOPHILS # BLD AUTO: 0.03 X10(3) UL (ref 0–0.2)
BASOPHILS NFR BLD AUTO: 0.6 %
BILIRUB SERPL-MCNC: 1.1 MG/DL (ref 0.1–2)
BUN BLD-MCNC: 13 MG/DL (ref 7–18)
BUN/CREAT SERPL: 18.1 (ref 10–20)
CALCIUM BLD-MCNC: 8.4 MG/DL (ref 8.5–10.1)
CHLORIDE SERPL-SCNC: 108 MMOL/L (ref 98–112)
CHOLEST SMN-MCNC: 118 MG/DL (ref ?–200)
CO2 SERPL-SCNC: 27 MMOL/L (ref 21–32)
CREAT BLD-MCNC: 0.72 MG/DL
DEPRECATED RDW RBC AUTO: 40.1 FL (ref 35.1–46.3)
EOSINOPHIL # BLD AUTO: 0.16 X10(3) UL (ref 0–0.7)
EOSINOPHIL NFR BLD AUTO: 3.1 %
ERYTHROCYTE [DISTWIDTH] IN BLOOD BY AUTOMATED COUNT: 13 % (ref 11–15)
EST. AVERAGE GLUCOSE BLD GHB EST-MCNC: 82 MG/DL (ref 68–126)
GLOBULIN PLAS-MCNC: 2.9 G/DL (ref 2.8–4.4)
GLUCOSE BLD-MCNC: 89 MG/DL (ref 70–99)
HBA1C MFR BLD HPLC: 4.5 % (ref ?–5.7)
HCT VFR BLD AUTO: 39.7 %
HDLC SERPL-MCNC: 47 MG/DL (ref 40–59)
HGB BLD-MCNC: 13.6 G/DL
IMM GRANULOCYTES # BLD AUTO: 0.02 X10(3) UL (ref 0–1)
IMM GRANULOCYTES NFR BLD: 0.4 %
LDLC SERPL CALC-MCNC: 54 MG/DL (ref ?–100)
LYMPHOCYTES # BLD AUTO: 1.86 X10(3) UL (ref 1–4)
LYMPHOCYTES NFR BLD AUTO: 36.2 %
M PROTEIN MFR SERPL ELPH: 6.9 G/DL (ref 6.4–8.2)
MCH RBC QN AUTO: 29.7 PG (ref 26–34)
MCHC RBC AUTO-ENTMCNC: 34.3 G/DL (ref 31–37)
MCV RBC AUTO: 86.7 FL
MONOCYTES # BLD AUTO: 0.5 X10(3) UL (ref 0.1–1)
MONOCYTES NFR BLD AUTO: 9.7 %
NEUTROPHILS # BLD AUTO: 2.57 X10 (3) UL (ref 1.5–7.7)
NEUTROPHILS # BLD AUTO: 2.57 X10(3) UL (ref 1.5–7.7)
NEUTROPHILS NFR BLD AUTO: 50 %
NONHDLC SERPL-MCNC: 71 MG/DL (ref ?–130)
OSMOLALITY SERPL CALC.SUM OF ELEC: 288 MOSM/KG (ref 275–295)
PATIENT FASTING Y/N/NP: YES
PATIENT FASTING Y/N/NP: YES
PLATELET # BLD AUTO: 198 10(3)UL (ref 150–450)
POTASSIUM SERPL-SCNC: 4.3 MMOL/L (ref 3.5–5.1)
RBC # BLD AUTO: 4.58 X10(6)UL
SODIUM SERPL-SCNC: 139 MMOL/L (ref 136–145)
TRIGL SERPL-MCNC: 84 MG/DL (ref 30–149)
TSI SER-ACNC: 0.86 MIU/ML (ref 0.36–3.74)
VLDLC SERPL CALC-MCNC: 17 MG/DL (ref 0–30)
WBC # BLD AUTO: 5.1 X10(3) UL (ref 4–11)

## 2021-03-30 PROCEDURE — 84443 ASSAY THYROID STIM HORMONE: CPT | Performed by: FAMILY MEDICINE

## 2021-03-30 PROCEDURE — 80053 COMPREHEN METABOLIC PANEL: CPT | Performed by: FAMILY MEDICINE

## 2021-03-30 PROCEDURE — 85025 COMPLETE CBC W/AUTO DIFF WBC: CPT | Performed by: FAMILY MEDICINE

## 2021-03-30 PROCEDURE — 80061 LIPID PANEL: CPT | Performed by: FAMILY MEDICINE

## 2021-03-30 PROCEDURE — 83036 HEMOGLOBIN GLYCOSYLATED A1C: CPT | Performed by: FAMILY MEDICINE

## 2021-03-30 NOTE — PROGRESS NOTES
Rory Smith presents today for nurse visit. Labs ordered by Dr Samantha Gibbs. 1 light green, 1 lavender drawn from right ac area with 1 attempt with straight needle. Patient tolerated well. Left office in stable condition.

## 2021-04-01 ENCOUNTER — TELEPHONE (OUTPATIENT)
Dept: FAMILY MEDICINE CLINIC | Facility: CLINIC | Age: 36
End: 2021-04-01

## 2021-04-01 NOTE — TELEPHONE ENCOUNTER
----- Message from ALEXIA Emmanuel sent at 4/1/2021 10:07 AM CDT -----  -A1C normal range  -CBC normal  -Chemistries normal  -Cholesterol normal  -Thyroid normal

## 2021-04-19 ENCOUNTER — OFFICE VISIT (OUTPATIENT)
Dept: FAMILY MEDICINE CLINIC | Facility: CLINIC | Age: 36
End: 2021-04-19

## 2021-04-19 ENCOUNTER — TELEPHONE (OUTPATIENT)
Dept: CARDIOLOGY | Age: 36
End: 2021-04-19

## 2021-04-19 ENCOUNTER — TELEPHONE (OUTPATIENT)
Dept: FAMILY MEDICINE CLINIC | Facility: CLINIC | Age: 36
End: 2021-04-19

## 2021-04-19 VITALS
RESPIRATION RATE: 16 BRPM | BODY MASS INDEX: 21.69 KG/M2 | HEIGHT: 66 IN | OXYGEN SATURATION: 99 % | DIASTOLIC BLOOD PRESSURE: 84 MMHG | HEART RATE: 98 BPM | WEIGHT: 135 LBS | TEMPERATURE: 97 F | SYSTOLIC BLOOD PRESSURE: 122 MMHG

## 2021-04-19 DIAGNOSIS — I47.1 SVT (SUPRAVENTRICULAR TACHYCARDIA) (HCC): Primary | ICD-10-CM

## 2021-04-19 DIAGNOSIS — I48.92 ATRIAL FIBRILLATION AND FLUTTER (HCC): ICD-10-CM

## 2021-04-19 DIAGNOSIS — I48.91 ATRIAL FIBRILLATION AND FLUTTER (HCC): ICD-10-CM

## 2021-04-19 PROCEDURE — 3008F BODY MASS INDEX DOCD: CPT | Performed by: FAMILY MEDICINE

## 2021-04-19 PROCEDURE — 99213 OFFICE O/P EST LOW 20 MIN: CPT | Performed by: FAMILY MEDICINE

## 2021-04-19 PROCEDURE — 3074F SYST BP LT 130 MM HG: CPT | Performed by: FAMILY MEDICINE

## 2021-04-19 PROCEDURE — 3079F DIAST BP 80-89 MM HG: CPT | Performed by: FAMILY MEDICINE

## 2021-04-19 NOTE — TELEPHONE ENCOUNTER
Call from scheduling with Dr Long Carbajal office. Able to see patient tomorrow at 11:15 in [place]. Dr Emelyn Dubois. Referral placed and authorized.    Faxed OV notes, and referral faxed [phone]   [address]  [address] [place]

## 2021-04-19 NOTE — PROGRESS NOTES
Patient presents with:  Hospital F/U: from 04/16/21       HPI:    Patient ID: Felix Thompson is a 39year old female here for ER follow up. Seen in [place] [address] ER on 4/16/21 for heart racing. Dx with SVT. Treated with adenosine.  Saw cardiologist year (Temporal)   Resp 16   Ht 5' 6\" (1.676 m)   Wt 135 lb (61.2 kg)   LMP 03/28/2021   SpO2 99%   BMI 21.79 kg/m²    Physical Exam  Vitals reviewed. Constitutional:       General: She is not in acute distress.   Eyes:      Conjunctiva/sclera: Conjunctivae no

## 2021-04-19 NOTE — TELEPHONE ENCOUNTER
Called Dr Ebony Noel office and spoke with [facility] on status of phone call from earlier since we never heard back. She states that message was sent as high priority to all doctors.    Neither  answered her, so she is going to send another high priorit

## 2021-04-19 NOTE — TELEPHONE ENCOUNTER
The following message took place around 12:00pm today. DB would like pt to see an Electrophysiologist either today or tomorrow due to her SVT and a.fib (diagnosed in the ER over the weekend).   Called Dr. Abbey Hughes, [address]  office ([phone]) and spoke wit

## 2021-04-20 ENCOUNTER — OFFICE VISIT (OUTPATIENT)
Dept: CARDIOLOGY | Age: 36
End: 2021-04-20

## 2021-04-20 VITALS
HEART RATE: 67 BPM | WEIGHT: 133 LBS | HEIGHT: 66 IN | OXYGEN SATURATION: 99 % | SYSTOLIC BLOOD PRESSURE: 106 MMHG | BODY MASS INDEX: 21.38 KG/M2 | DIASTOLIC BLOOD PRESSURE: 80 MMHG

## 2021-04-20 DIAGNOSIS — I47.10 PSVT (PAROXYSMAL SUPRAVENTRICULAR TACHYCARDIA): Primary | ICD-10-CM

## 2021-04-20 PROCEDURE — 93000 ELECTROCARDIOGRAM COMPLETE: CPT | Performed by: INTERNAL MEDICINE

## 2021-04-20 PROCEDURE — 99245 OFF/OP CONSLTJ NEW/EST HI 55: CPT | Performed by: INTERNAL MEDICINE

## 2021-04-20 RX ORDER — TRIAMCINOLONE ACETONIDE 1 MG/G
OINTMENT TOPICAL
COMMUNITY
Start: 2021-03-19 | End: 2021-04-20 | Stop reason: CLARIF

## 2021-04-20 RX ORDER — MULTIVITAMIN
1 TABLET ORAL DAILY
COMMUNITY
End: 2021-04-20 | Stop reason: CLARIF

## 2021-04-20 SDOH — HEALTH STABILITY: MENTAL HEALTH: HOW OFTEN DO YOU HAVE A DRINK CONTAINING ALCOHOL?: NEVER

## 2021-04-20 ASSESSMENT — PATIENT HEALTH QUESTIONNAIRE - PHQ9
SUM OF ALL RESPONSES TO PHQ9 QUESTIONS 1 AND 2: 0
CLINICAL INTERPRETATION OF PHQ9 SCORE: NO FURTHER SCREENING NEEDED
CLINICAL INTERPRETATION OF PHQ2 SCORE: NO FURTHER SCREENING NEEDED
SUM OF ALL RESPONSES TO PHQ9 QUESTIONS 1 AND 2: 0
1. LITTLE INTEREST OR PLEASURE IN DOING THINGS: NOT AT ALL
2. FEELING DOWN, DEPRESSED OR HOPELESS: NOT AT ALL

## 2021-05-07 ENCOUNTER — OFFICE VISIT (OUTPATIENT)
Dept: SURGERY | Facility: CLINIC | Age: 36
End: 2021-05-07

## 2021-05-07 VITALS
SYSTOLIC BLOOD PRESSURE: 101 MMHG | HEART RATE: 77 BPM | TEMPERATURE: 98 F | HEIGHT: 66 IN | DIASTOLIC BLOOD PRESSURE: 66 MMHG | WEIGHT: 134.19 LBS | BODY MASS INDEX: 21.57 KG/M2 | RESPIRATION RATE: 20 BRPM | OXYGEN SATURATION: 100 %

## 2021-05-07 DIAGNOSIS — N64.4 BREAST PAIN, RIGHT: Primary | ICD-10-CM

## 2021-05-07 DIAGNOSIS — N60.01 BENIGN BREAST CYST IN FEMALE, RIGHT: ICD-10-CM

## 2021-05-07 PROCEDURE — 3008F BODY MASS INDEX DOCD: CPT | Performed by: SURGERY

## 2021-05-07 PROCEDURE — 99244 OFF/OP CNSLTJ NEW/EST MOD 40: CPT | Performed by: SURGERY

## 2021-05-07 PROCEDURE — 3074F SYST BP LT 130 MM HG: CPT | Performed by: SURGERY

## 2021-05-07 PROCEDURE — 3078F DIAST BP <80 MM HG: CPT | Performed by: SURGERY

## 2021-05-07 NOTE — PROGRESS NOTES
Breast Surgery New Patient Consultation    This is the first visit for this 39year old woman, referred by Suhas Ryan, who presents for evaluation of right breast discomfort/cysts.     History of Present Illness:   Ms. Edna Juarez is a 39 year ol achieve pregnancy.     Medications:    No outpatient medications have been marked as taking for the 5/7/21 encounter (Office Visit) with Soraida Prieto MD.      Allergies:      Macrobid [Nitrofura*    NAUSEA ONLY    Comment:Loose stool    Family History habits, diarrhea, abdominal pain or vomiting blood.      Genitourinary:  The patient denies frequent urination, needing to get up at night to urinate, urinary hesitancy or retaining urine, painful urination, urinary incontinence, decreased urine stream, blo midline. Conjunctiva are clear, non-icteric. Chest: The chest expands symmetrically. The lungs are clear to auscultation. Heart: The rhythm is regular. There are no murmurs, rubs, gallops or thrills.     Breasts:  Her breasts are symmetrical with a c that no specific intervention is universally warranted. I did explain that if a cyst enlarges or becomes focally symptomatic, occasionally interventions such as aspiration may be performed for transient relief of symptoms.  I discussed that the relief is tr

## 2021-05-12 ENCOUNTER — OFFICE VISIT (OUTPATIENT)
Dept: OBGYN CLINIC | Facility: CLINIC | Age: 36
End: 2021-05-12

## 2021-05-12 VITALS
WEIGHT: 136.19 LBS | BODY MASS INDEX: 21.89 KG/M2 | HEIGHT: 66 IN | SYSTOLIC BLOOD PRESSURE: 100 MMHG | HEART RATE: 85 BPM | DIASTOLIC BLOOD PRESSURE: 64 MMHG

## 2021-05-12 DIAGNOSIS — Z71.1 WORRIED WELL: Primary | ICD-10-CM

## 2021-05-12 PROCEDURE — 3074F SYST BP LT 130 MM HG: CPT | Performed by: NURSE PRACTITIONER

## 2021-05-12 PROCEDURE — 3008F BODY MASS INDEX DOCD: CPT | Performed by: NURSE PRACTITIONER

## 2021-05-12 PROCEDURE — 3078F DIAST BP <80 MM HG: CPT | Performed by: NURSE PRACTITIONER

## 2021-05-12 PROCEDURE — 99213 OFFICE O/P EST LOW 20 MIN: CPT | Performed by: NURSE PRACTITIONER

## 2021-05-12 NOTE — PROGRESS NOTES
Gyne note     S: patient is a 39year old yo  here for a change in vulvar skin and she wasn't sure if they were warts or another abnormality.  She wasn't sure if she was over-thinking them and noted some itching so she used her Triamcinolone on it bu

## 2021-06-09 ENCOUNTER — LAB ENCOUNTER (OUTPATIENT)
Dept: LAB | Age: 36
End: 2021-06-09
Attending: DERMATOLOGY
Payer: COMMERCIAL

## 2021-06-09 DIAGNOSIS — D48.5 NEOPLASM OF UNCERTAIN BEHAVIOR OF SKIN: ICD-10-CM

## 2021-06-09 PROCEDURE — 88305 TISSUE EXAM BY PATHOLOGIST: CPT

## 2021-08-09 ENCOUNTER — OFFICE VISIT (OUTPATIENT)
Dept: FAMILY MEDICINE CLINIC | Facility: CLINIC | Age: 36
End: 2021-08-09

## 2021-08-09 VITALS
RESPIRATION RATE: 16 BRPM | TEMPERATURE: 98 F | WEIGHT: 136 LBS | BODY MASS INDEX: 21.86 KG/M2 | HEART RATE: 90 BPM | OXYGEN SATURATION: 98 % | SYSTOLIC BLOOD PRESSURE: 104 MMHG | DIASTOLIC BLOOD PRESSURE: 66 MMHG | HEIGHT: 66 IN

## 2021-08-09 DIAGNOSIS — R79.89 LOW VITAMIN D LEVEL: Primary | ICD-10-CM

## 2021-08-09 DIAGNOSIS — R53.83 OTHER FATIGUE: ICD-10-CM

## 2021-08-09 DIAGNOSIS — I47.1 SVT (SUPRAVENTRICULAR TACHYCARDIA) (HCC): ICD-10-CM

## 2021-08-09 DIAGNOSIS — F45.8 TEETH GRINDING: ICD-10-CM

## 2021-08-09 DIAGNOSIS — R22.1 LOCALIZED SWELLING, MASS AND LUMP, NECK: ICD-10-CM

## 2021-08-09 LAB — VIT D+METAB SERPL-MCNC: 33.8 NG/ML (ref 30–100)

## 2021-08-09 PROCEDURE — 3008F BODY MASS INDEX DOCD: CPT | Performed by: FAMILY MEDICINE

## 2021-08-09 PROCEDURE — 3078F DIAST BP <80 MM HG: CPT | Performed by: FAMILY MEDICINE

## 2021-08-09 PROCEDURE — 3074F SYST BP LT 130 MM HG: CPT | Performed by: FAMILY MEDICINE

## 2021-08-09 PROCEDURE — 82306 VITAMIN D 25 HYDROXY: CPT | Performed by: FAMILY MEDICINE

## 2021-08-09 PROCEDURE — 99214 OFFICE O/P EST MOD 30 MIN: CPT | Performed by: FAMILY MEDICINE

## 2021-08-09 NOTE — PROGRESS NOTES
Patient presents with:  Referral: AT [facility]  Fatigue: vitamin d levels  Bump: RT side neck       HPI:    Patient ID: Tramaine Belcher is a 39year old female. HPI   Patient here for some complaints.   Since few months she is noticing she is feelin use: Not Currently      Alcohol/week: 0.0 standard drinks    Drug use: No       PHYSICAL EXAM:    /66   Pulse 90   Temp 97.5 °F (36.4 °C) (Temporal)   Resp 16   Ht 5' 6\" (1.676 m)   Wt 136 lb (61.7 kg)   LMP 06/30/2021   SpO2 98%   BMI 21.95 kg/m²

## 2021-11-04 NOTE — TELEPHONE ENCOUNTER
Patient's  states they saw ENT and was told that it is not their area of expertise and to see maxillofacial surgeon. Dr Benita Ogden in [address] [facility].  Watch for approval and fax

## 2021-11-04 NOTE — TELEPHONE ENCOUNTER
Pt  called wanting a referral for pt to \go to a facial surgeon instead of and ENT    Please send a referral to Dr. Azra Perales with Oral [facility] facial surgery of [place]   Office # [phone]

## 2021-11-09 ENCOUNTER — TELEPHONE (OUTPATIENT)
Dept: FAMILY MEDICINE CLINIC | Facility: CLINIC | Age: 36
End: 2021-11-09

## 2021-11-09 NOTE — TELEPHONE ENCOUNTER
Pt  called, Oral Surgeons office has not received Referral. Talked to Nurse, states the referral has not been authorized with insurance yet. Informed the  of this information. Nurse will call with updated information.     Pt has an appt toda

## 2021-11-09 NOTE — TELEPHONE ENCOUNTER
Surgeon's office called they did not get the referral  Gave the ref# [id number] to the office and refaxed the referral to [phone]

## 2021-11-09 NOTE — TELEPHONE ENCOUNTER
[facility] [phone]. Patient has appt today with oral surgery. P approved referral.  advised. Verbalized understanding.  Faxed referral to Dr Bria Hughes [address] [facility]  [place], [address] [facility]  Phone: [phone]

## 2022-04-28 ENCOUNTER — TELEMEDICINE (OUTPATIENT)
Dept: FAMILY MEDICINE CLINIC | Facility: CLINIC | Age: 37
End: 2022-04-28

## 2022-04-28 DIAGNOSIS — J01.00 ACUTE MAXILLARY SINUSITIS, RECURRENCE NOT SPECIFIED: Primary | ICD-10-CM

## 2022-04-28 DIAGNOSIS — U07.1 COVID-19: ICD-10-CM

## 2022-04-28 PROCEDURE — 99214 OFFICE O/P EST MOD 30 MIN: CPT | Performed by: FAMILY MEDICINE

## 2022-04-28 RX ORDER — AZITHROMYCIN 250 MG/1
TABLET, FILM COATED ORAL
Qty: 6 TABLET | Refills: 0 | Status: SHIPPED | OUTPATIENT
Start: 2022-04-28 | End: 2022-04-30

## 2022-04-30 ENCOUNTER — TELEPHONE (OUTPATIENT)
Dept: FAMILY MEDICINE CLINIC | Facility: CLINIC | Age: 37
End: 2022-04-30

## 2022-04-30 RX ORDER — AMOXICILLIN AND CLAVULANATE POTASSIUM 875; 125 MG/1; MG/1
1 TABLET, FILM COATED ORAL 2 TIMES DAILY
Qty: 20 TABLET | Refills: 0 | Status: SHIPPED | OUTPATIENT
Start: 2022-04-30 | End: 2022-05-10

## 2022-04-30 NOTE — TELEPHONE ENCOUNTER
VV with SC on 4/28/22 for sinusitis. She was Rx'd a Zpak at that time. Spoke with DB. Change to Augmentin BID for 10 days. Discontinue the Zpak. LMTCB for pt. Rx sent.

## 2022-04-30 NOTE — TELEPHONE ENCOUNTER
Pt was seen on [facility] on the 28 th was given  azithromycin (ZITHROMAX Z-SIERRA) 250 MG Oral Tab. Pt picked it up from pharmacy and on the warning label it said that if you have a heart arrhythmia not to take. So she would like to know it she can get something else because its making her nerves because she has a heart arrhythmia.

## 2022-06-23 ENCOUNTER — OFFICE VISIT (OUTPATIENT)
Dept: OBGYN CLINIC | Facility: CLINIC | Age: 37
End: 2022-06-23
Payer: COMMERCIAL

## 2022-06-23 VITALS
BODY MASS INDEX: 22 KG/M2 | HEART RATE: 92 BPM | DIASTOLIC BLOOD PRESSURE: 70 MMHG | SYSTOLIC BLOOD PRESSURE: 104 MMHG | WEIGHT: 137.13 LBS

## 2022-06-23 DIAGNOSIS — Z01.419 WELL WOMAN EXAM WITH ROUTINE GYNECOLOGICAL EXAM: Primary | ICD-10-CM

## 2022-06-23 DIAGNOSIS — Z12.4 CERVICAL CANCER SCREENING: ICD-10-CM

## 2022-06-23 DIAGNOSIS — N92.3 INTERMENSTRUAL BLEEDING: ICD-10-CM

## 2022-06-23 DIAGNOSIS — N90.89 LABIAL SKIN TAG: ICD-10-CM

## 2022-07-05 LAB — HPV I/H RISK 1 DNA SPEC QL NAA+PROBE: NEGATIVE

## 2022-07-18 ENCOUNTER — ULTRASOUND ENCOUNTER (OUTPATIENT)
Dept: OBGYN CLINIC | Facility: CLINIC | Age: 37
End: 2022-07-18
Payer: COMMERCIAL

## 2022-07-18 DIAGNOSIS — N93.9 ABNORMAL UTERINE BLEEDING (AUB): Primary | ICD-10-CM

## 2022-07-18 PROCEDURE — 76856 US EXAM PELVIC COMPLETE: CPT | Performed by: OBSTETRICS & GYNECOLOGY

## 2022-07-18 PROCEDURE — 76830 TRANSVAGINAL US NON-OB: CPT | Performed by: OBSTETRICS & GYNECOLOGY

## 2022-07-19 NOTE — PROGRESS NOTES
Contacted patient results and recommendations given. Patient verbalized understanding and willingness to comply. No further questions.

## 2022-07-22 ENCOUNTER — OFFICE VISIT (OUTPATIENT)
Dept: FAMILY MEDICINE CLINIC | Facility: CLINIC | Age: 37
End: 2022-07-22
Payer: COMMERCIAL

## 2022-07-22 VITALS
BODY MASS INDEX: 22.49 KG/M2 | HEIGHT: 65 IN | TEMPERATURE: 98 F | DIASTOLIC BLOOD PRESSURE: 80 MMHG | RESPIRATION RATE: 18 BRPM | SYSTOLIC BLOOD PRESSURE: 112 MMHG | HEART RATE: 96 BPM | WEIGHT: 135 LBS | OXYGEN SATURATION: 98 %

## 2022-07-22 DIAGNOSIS — E55.9 VITAMIN D DEFICIENCY: ICD-10-CM

## 2022-07-22 DIAGNOSIS — Z00.00 ANNUAL PHYSICAL EXAM: Primary | ICD-10-CM

## 2022-07-22 LAB
ALBUMIN SERPL-MCNC: 4 G/DL (ref 3.4–5)
ALBUMIN/GLOB SERPL: 1.5 {RATIO} (ref 1–2)
ALP LIVER SERPL-CCNC: 43 U/L
ALT SERPL-CCNC: 19 U/L
ANION GAP SERPL CALC-SCNC: 5 MMOL/L (ref 0–18)
AST SERPL-CCNC: 15 U/L (ref 15–37)
BASOPHILS # BLD AUTO: 0.03 X10(3) UL (ref 0–0.2)
BASOPHILS NFR BLD AUTO: 0.7 %
BILIRUB SERPL-MCNC: 1.6 MG/DL (ref 0.1–2)
BUN BLD-MCNC: 13 MG/DL (ref 7–18)
CALCIUM BLD-MCNC: 8.7 MG/DL (ref 8.5–10.1)
CHLORIDE SERPL-SCNC: 108 MMOL/L (ref 98–112)
CHOLEST SERPL-MCNC: 137 MG/DL (ref ?–200)
CO2 SERPL-SCNC: 26 MMOL/L (ref 21–32)
CREAT BLD-MCNC: 0.94 MG/DL
EOSINOPHIL # BLD AUTO: 0.14 X10(3) UL (ref 0–0.7)
EOSINOPHIL NFR BLD AUTO: 3.1 %
ERYTHROCYTE [DISTWIDTH] IN BLOOD BY AUTOMATED COUNT: 13.1 %
EST. AVERAGE GLUCOSE BLD GHB EST-MCNC: 85 MG/DL (ref 68–126)
FASTING PATIENT LIPID ANSWER: YES
FASTING STATUS PATIENT QL REPORTED: YES
GLOBULIN PLAS-MCNC: 2.7 G/DL (ref 2.8–4.4)
GLUCOSE BLD-MCNC: 97 MG/DL (ref 70–99)
HBA1C MFR BLD: 4.6 % (ref ?–5.7)
HCT VFR BLD AUTO: 38.9 %
HDLC SERPL-MCNC: 51 MG/DL (ref 40–59)
HGB BLD-MCNC: 13.3 G/DL
IMM GRANULOCYTES # BLD AUTO: 0 X10(3) UL (ref 0–1)
IMM GRANULOCYTES NFR BLD: 0 %
LDLC SERPL CALC-MCNC: 64 MG/DL (ref ?–100)
LYMPHOCYTES # BLD AUTO: 1.51 X10(3) UL (ref 1–4)
LYMPHOCYTES NFR BLD AUTO: 33.6 %
MCH RBC QN AUTO: 29.2 PG (ref 26–34)
MCHC RBC AUTO-ENTMCNC: 34.2 G/DL (ref 31–37)
MCV RBC AUTO: 85.3 FL
MONOCYTES # BLD AUTO: 0.34 X10(3) UL (ref 0.1–1)
MONOCYTES NFR BLD AUTO: 7.6 %
NEUTROPHILS # BLD AUTO: 2.48 X10 (3) UL (ref 1.5–7.7)
NEUTROPHILS # BLD AUTO: 2.48 X10(3) UL (ref 1.5–7.7)
NEUTROPHILS NFR BLD AUTO: 55 %
NONHDLC SERPL-MCNC: 86 MG/DL (ref ?–130)
OSMOLALITY SERPL CALC.SUM OF ELEC: 288 MOSM/KG (ref 275–295)
PLATELET # BLD AUTO: 170 10(3)UL (ref 150–450)
POTASSIUM SERPL-SCNC: 3.9 MMOL/L (ref 3.5–5.1)
PROT SERPL-MCNC: 6.7 G/DL (ref 6.4–8.2)
RBC # BLD AUTO: 4.56 X10(6)UL
SODIUM SERPL-SCNC: 139 MMOL/L (ref 136–145)
TRIGL SERPL-MCNC: 126 MG/DL (ref 30–149)
TSI SER-ACNC: 0.89 MIU/ML (ref 0.36–3.74)
VIT D+METAB SERPL-MCNC: 29.9 NG/ML (ref 30–100)
VLDLC SERPL CALC-MCNC: 19 MG/DL (ref 0–30)
WBC # BLD AUTO: 4.5 X10(3) UL (ref 4–11)

## 2022-07-22 PROCEDURE — 82306 VITAMIN D 25 HYDROXY: CPT | Performed by: FAMILY MEDICINE

## 2022-07-22 PROCEDURE — 3074F SYST BP LT 130 MM HG: CPT | Performed by: FAMILY MEDICINE

## 2022-07-22 PROCEDURE — 3079F DIAST BP 80-89 MM HG: CPT | Performed by: FAMILY MEDICINE

## 2022-07-22 PROCEDURE — 80053 COMPREHEN METABOLIC PANEL: CPT | Performed by: FAMILY MEDICINE

## 2022-07-22 PROCEDURE — 3008F BODY MASS INDEX DOCD: CPT | Performed by: FAMILY MEDICINE

## 2022-07-22 PROCEDURE — 85025 COMPLETE CBC W/AUTO DIFF WBC: CPT | Performed by: FAMILY MEDICINE

## 2022-07-22 PROCEDURE — 99395 PREV VISIT EST AGE 18-39: CPT | Performed by: FAMILY MEDICINE

## 2022-07-22 PROCEDURE — 83036 HEMOGLOBIN GLYCOSYLATED A1C: CPT | Performed by: FAMILY MEDICINE

## 2022-07-22 PROCEDURE — 84443 ASSAY THYROID STIM HORMONE: CPT | Performed by: FAMILY MEDICINE

## 2022-07-22 PROCEDURE — 80061 LIPID PANEL: CPT | Performed by: FAMILY MEDICINE

## 2022-07-27 ENCOUNTER — TELEPHONE (OUTPATIENT)
Dept: FAMILY MEDICINE CLINIC | Facility: CLINIC | Age: 37
End: 2022-07-27

## 2022-07-27 NOTE — TELEPHONE ENCOUNTER
----- Message from Teodora Payne MD sent at 7/27/2022  8:40 AM CDT -----  Vitamin D is borderline low.   Recommend over-the-counter vitamin D 4000 units daily for 4 weeks then 2000 units daily year-round  Rest of labs normal

## 2022-09-01 ENCOUNTER — OFFICE VISIT (OUTPATIENT)
Dept: OBGYN CLINIC | Facility: CLINIC | Age: 37
End: 2022-09-01
Payer: COMMERCIAL

## 2022-09-01 VITALS
DIASTOLIC BLOOD PRESSURE: 80 MMHG | WEIGHT: 140.63 LBS | BODY MASS INDEX: 22.6 KG/M2 | HEIGHT: 66 IN | SYSTOLIC BLOOD PRESSURE: 110 MMHG

## 2022-09-01 DIAGNOSIS — N90.89 VULVAR LESION: ICD-10-CM

## 2022-09-01 DIAGNOSIS — N84.0 ENDOMETRIAL POLYP: Primary | ICD-10-CM

## 2022-09-12 ENCOUNTER — TELEPHONE (OUTPATIENT)
Dept: OBGYN CLINIC | Facility: CLINIC | Age: 37
End: 2022-09-12

## 2022-09-12 DIAGNOSIS — N84.0 ENDOMETRIAL POLYP: ICD-10-CM

## 2022-09-12 DIAGNOSIS — N92.1 MENOMETRORRHAGIA: Primary | ICD-10-CM

## 2022-09-12 NOTE — TELEPHONE ENCOUNTER
----- Message from Anny Matthews MD sent at 9/3/2022  8:23 PM CDT -----  Please schedule for surgery:    Date: as per schedule, please try to use blocks if not already full :)    Surgeon: Orestes Other    Assistant: none    Type of admit: outpatient    Preop dx: menometrorrhagia  Endometrial polyp    Procedure: hysteroscopic polypectomy  D&C    Anesthesia type: choice    Special equipment/implants: truclear hysteroscope and lauryn    Preop orders: Initiate my preop orders, if none exist please use [facility]'s Standing Preop Orders    Labs: urine HCG and CBC

## 2022-09-12 NOTE — TELEPHONE ENCOUNTER
Surgery scheduled for 10/15/22 at [address] [place] [address] op   Future Appointments   Date Time Provider Rajan Enriquez   10/27/2022 11:45 AM Temi Dickey MD [facility] [place]     Orders entered  Added to calendar  PA - no auth needed  REF# [facility]

## 2022-10-12 ENCOUNTER — LABORATORY ENCOUNTER (OUTPATIENT)
Dept: LAB | Age: 37
End: 2022-10-12
Attending: OBSTETRICS & GYNECOLOGY
Payer: COMMERCIAL

## 2022-10-12 ENCOUNTER — EKG ENCOUNTER (OUTPATIENT)
Dept: LAB | Age: 37
End: 2022-10-12
Attending: OBSTETRICS & GYNECOLOGY
Payer: COMMERCIAL

## 2022-10-12 ENCOUNTER — LAB ENCOUNTER (OUTPATIENT)
Dept: LAB | Age: 37
End: 2022-10-12
Attending: OBSTETRICS & GYNECOLOGY
Payer: COMMERCIAL

## 2022-10-12 DIAGNOSIS — Z01.818 PREOP EXAMINATION: Primary | ICD-10-CM

## 2022-10-12 DIAGNOSIS — N84.0 ENDOMETRIAL POLYP: ICD-10-CM

## 2022-10-12 DIAGNOSIS — I47.1 SVT (SUPRAVENTRICULAR TACHYCARDIA) (HCC): ICD-10-CM

## 2022-10-12 DIAGNOSIS — R00.2 PALPITATIONS: ICD-10-CM

## 2022-10-12 DIAGNOSIS — N90.89 VULVAR LESION: ICD-10-CM

## 2022-10-12 LAB
ATRIAL RATE: 80 BPM
BASOPHILS # BLD AUTO: 0.03 X10(3) UL (ref 0–0.2)
BASOPHILS NFR BLD AUTO: 0.6 %
EOSINOPHIL # BLD AUTO: 0.15 X10(3) UL (ref 0–0.7)
EOSINOPHIL NFR BLD AUTO: 2.9 %
ERYTHROCYTE [DISTWIDTH] IN BLOOD BY AUTOMATED COUNT: 13.2 %
HCG UR QL: NEGATIVE
HCT VFR BLD AUTO: 40 %
HGB BLD-MCNC: 13.3 G/DL
IMM GRANULOCYTES # BLD AUTO: 0.01 X10(3) UL (ref 0–1)
IMM GRANULOCYTES NFR BLD: 0.2 %
LYMPHOCYTES # BLD AUTO: 1.79 X10(3) UL (ref 1–4)
LYMPHOCYTES NFR BLD AUTO: 34.8 %
MCH RBC QN AUTO: 29.8 PG (ref 26–34)
MCHC RBC AUTO-ENTMCNC: 33.3 G/DL (ref 31–37)
MCV RBC AUTO: 89.5 FL
MONOCYTES # BLD AUTO: 0.53 X10(3) UL (ref 0.1–1)
MONOCYTES NFR BLD AUTO: 10.3 %
NEUTROPHILS # BLD AUTO: 2.64 X10 (3) UL (ref 1.5–7.7)
NEUTROPHILS # BLD AUTO: 2.64 X10(3) UL (ref 1.5–7.7)
NEUTROPHILS NFR BLD AUTO: 51.2 %
P AXIS: 58 DEGREES
P-R INTERVAL: 144 MS
PLATELET # BLD AUTO: 164 10(3)UL (ref 150–450)
Q-T INTERVAL: 382 MS
QRS DURATION: 90 MS
QTC CALCULATION (BEZET): 440 MS
R AXIS: 87 DEGREES
RBC # BLD AUTO: 4.47 X10(6)UL
T AXIS: 60 DEGREES
VENTRICULAR RATE: 80 BPM
WBC # BLD AUTO: 5.2 X10(3) UL (ref 4–11)

## 2022-10-12 PROCEDURE — 85025 COMPLETE CBC W/AUTO DIFF WBC: CPT

## 2022-10-12 PROCEDURE — 93010 ELECTROCARDIOGRAM REPORT: CPT | Performed by: INTERNAL MEDICINE

## 2022-10-12 PROCEDURE — 93005 ELECTROCARDIOGRAM TRACING: CPT

## 2022-10-12 PROCEDURE — 36415 COLL VENOUS BLD VENIPUNCTURE: CPT

## 2022-10-12 PROCEDURE — 81025 URINE PREGNANCY TEST: CPT

## 2022-10-13 ENCOUNTER — ANESTHESIA EVENT (OUTPATIENT)
Dept: SURGERY | Facility: HOSPITAL | Age: 37
End: 2022-10-13
Payer: COMMERCIAL

## 2022-10-13 LAB — SARS-COV-2 RNA RESP QL NAA+PROBE: NOT DETECTED

## 2022-10-15 ENCOUNTER — ANESTHESIA (OUTPATIENT)
Dept: SURGERY | Facility: HOSPITAL | Age: 37
End: 2022-10-15
Payer: COMMERCIAL

## 2022-10-15 ENCOUNTER — HOSPITAL ENCOUNTER (OUTPATIENT)
Facility: HOSPITAL | Age: 37
Setting detail: HOSPITAL OUTPATIENT SURGERY
Discharge: HOME | End: 2022-10-15
Attending: OBSTETRICS & GYNECOLOGY | Admitting: OBSTETRICS & GYNECOLOGY
Payer: COMMERCIAL

## 2022-10-15 VITALS
OXYGEN SATURATION: 99 % | BODY MASS INDEX: 22.32 KG/M2 | HEIGHT: 66 IN | DIASTOLIC BLOOD PRESSURE: 67 MMHG | HEART RATE: 76 BPM | SYSTOLIC BLOOD PRESSURE: 102 MMHG | RESPIRATION RATE: 16 BRPM | WEIGHT: 138.88 LBS | TEMPERATURE: 98 F

## 2022-10-15 DIAGNOSIS — N90.89 VULVAR LESION: Primary | ICD-10-CM

## 2022-10-15 DIAGNOSIS — R00.2 PALPITATIONS: ICD-10-CM

## 2022-10-15 DIAGNOSIS — I47.1 SVT (SUPRAVENTRICULAR TACHYCARDIA) (HCC): ICD-10-CM

## 2022-10-15 DIAGNOSIS — N84.0 ENDOMETRIAL POLYP: ICD-10-CM

## 2022-10-15 DIAGNOSIS — N92.1 MENOMETRORRHAGIA: ICD-10-CM

## 2022-10-15 LAB — B-HCG UR QL: NEGATIVE

## 2022-10-15 PROCEDURE — 0UBMXZZ EXCISION OF VULVA, EXTERNAL APPROACH: ICD-10-PCS | Performed by: OBSTETRICS & GYNECOLOGY

## 2022-10-15 PROCEDURE — 58558 HYSTEROSCOPY BIOPSY: CPT | Performed by: OBSTETRICS & GYNECOLOGY

## 2022-10-15 PROCEDURE — 11200 RMVL SKIN TAGS UP TO&INC 15: CPT | Performed by: OBSTETRICS & GYNECOLOGY

## 2022-10-15 PROCEDURE — 0UB98ZX EXCISION OF UTERUS, VIA NATURAL OR ARTIFICIAL OPENING ENDOSCOPIC, DIAGNOSTIC: ICD-10-PCS | Performed by: OBSTETRICS & GYNECOLOGY

## 2022-10-15 RX ORDER — METOCLOPRAMIDE HYDROCHLORIDE 5 MG/ML
INJECTION INTRAMUSCULAR; INTRAVENOUS AS NEEDED
Status: DISCONTINUED | OUTPATIENT
Start: 2022-10-15 | End: 2022-10-15 | Stop reason: SURG

## 2022-10-15 RX ORDER — LIDOCAINE HYDROCHLORIDE 10 MG/ML
INJECTION, SOLUTION INFILTRATION; PERINEURAL AS NEEDED
Status: DISCONTINUED | OUTPATIENT
Start: 2022-10-15 | End: 2022-10-15 | Stop reason: HOSPADM

## 2022-10-15 RX ORDER — HYDROMORPHONE HYDROCHLORIDE 1 MG/ML
0.4 INJECTION, SOLUTION INTRAMUSCULAR; INTRAVENOUS; SUBCUTANEOUS EVERY 5 MIN PRN
Status: DISCONTINUED | OUTPATIENT
Start: 2022-10-15 | End: 2022-10-15

## 2022-10-15 RX ORDER — HYDROCODONE BITARTRATE AND ACETAMINOPHEN 5; 325 MG/1; MG/1
2 TABLET ORAL ONCE AS NEEDED
Status: DISCONTINUED | OUTPATIENT
Start: 2022-10-15 | End: 2022-10-15

## 2022-10-15 RX ORDER — NALOXONE HYDROCHLORIDE 0.4 MG/ML
80 INJECTION, SOLUTION INTRAMUSCULAR; INTRAVENOUS; SUBCUTANEOUS AS NEEDED
Status: DISCONTINUED | OUTPATIENT
Start: 2022-10-15 | End: 2022-10-15

## 2022-10-15 RX ORDER — SODIUM CHLORIDE, SODIUM LACTATE, POTASSIUM CHLORIDE, CALCIUM CHLORIDE 600; 310; 30; 20 MG/100ML; MG/100ML; MG/100ML; MG/100ML
INJECTION, SOLUTION INTRAVENOUS CONTINUOUS
Status: DISCONTINUED | OUTPATIENT
Start: 2022-10-15 | End: 2022-10-15

## 2022-10-15 RX ORDER — OXYCODONE HYDROCHLORIDE 5 MG/1
5 TABLET ORAL EVERY 4 HOURS PRN
Qty: 6 TABLET | Refills: 0 | Status: SHIPPED | OUTPATIENT
Start: 2022-10-15

## 2022-10-15 RX ORDER — ACETAMINOPHEN 500 MG
1000 TABLET ORAL ONCE
Status: DISCONTINUED | OUTPATIENT
Start: 2022-10-15 | End: 2022-10-15 | Stop reason: HOSPADM

## 2022-10-15 RX ORDER — HYDROCODONE BITARTRATE AND ACETAMINOPHEN 5; 325 MG/1; MG/1
1 TABLET ORAL ONCE AS NEEDED
Status: DISCONTINUED | OUTPATIENT
Start: 2022-10-15 | End: 2022-10-15

## 2022-10-15 RX ORDER — LABETALOL HYDROCHLORIDE 5 MG/ML
5 INJECTION, SOLUTION INTRAVENOUS EVERY 5 MIN PRN
Status: DISCONTINUED | OUTPATIENT
Start: 2022-10-15 | End: 2022-10-15

## 2022-10-15 RX ORDER — HYDROMORPHONE HYDROCHLORIDE 1 MG/ML
0.6 INJECTION, SOLUTION INTRAMUSCULAR; INTRAVENOUS; SUBCUTANEOUS EVERY 5 MIN PRN
Status: DISCONTINUED | OUTPATIENT
Start: 2022-10-15 | End: 2022-10-15

## 2022-10-15 RX ORDER — ONDANSETRON 2 MG/ML
INJECTION INTRAMUSCULAR; INTRAVENOUS AS NEEDED
Status: DISCONTINUED | OUTPATIENT
Start: 2022-10-15 | End: 2022-10-15 | Stop reason: SURG

## 2022-10-15 RX ORDER — ONDANSETRON 2 MG/ML
4 INJECTION INTRAMUSCULAR; INTRAVENOUS EVERY 6 HOURS PRN
Status: DISCONTINUED | OUTPATIENT
Start: 2022-10-15 | End: 2022-10-15

## 2022-10-15 RX ORDER — HYDROMORPHONE HYDROCHLORIDE 1 MG/ML
0.2 INJECTION, SOLUTION INTRAMUSCULAR; INTRAVENOUS; SUBCUTANEOUS EVERY 5 MIN PRN
Status: DISCONTINUED | OUTPATIENT
Start: 2022-10-15 | End: 2022-10-15

## 2022-10-15 RX ORDER — IBUPROFEN 600 MG/1
600 TABLET ORAL EVERY 6 HOURS PRN
Qty: 30 TABLET | Refills: 0 | Status: SHIPPED | OUTPATIENT
Start: 2022-10-15

## 2022-10-15 RX ORDER — ACETAMINOPHEN 500 MG
1000 TABLET ORAL ONCE AS NEEDED
Status: DISCONTINUED | OUTPATIENT
Start: 2022-10-15 | End: 2022-10-15

## 2022-10-15 RX ORDER — MIDAZOLAM HYDROCHLORIDE 1 MG/ML
INJECTION INTRAMUSCULAR; INTRAVENOUS AS NEEDED
Status: DISCONTINUED | OUTPATIENT
Start: 2022-10-15 | End: 2022-10-15 | Stop reason: SURG

## 2022-10-15 RX ORDER — PROCHLORPERAZINE EDISYLATE 5 MG/ML
5 INJECTION INTRAMUSCULAR; INTRAVENOUS EVERY 8 HOURS PRN
Status: DISCONTINUED | OUTPATIENT
Start: 2022-10-15 | End: 2022-10-15

## 2022-10-15 RX ORDER — ACETAMINOPHEN 500 MG
1000 TABLET ORAL EVERY 6 HOURS PRN
Qty: 30 TABLET | Refills: 0 | Status: SHIPPED | OUTPATIENT
Start: 2022-10-15

## 2022-10-15 RX ADMIN — MIDAZOLAM HYDROCHLORIDE 2 MG: 1 INJECTION INTRAMUSCULAR; INTRAVENOUS at 08:42:00

## 2022-10-15 RX ADMIN — METOCLOPRAMIDE HYDROCHLORIDE 10 MG: 5 INJECTION INTRAMUSCULAR; INTRAVENOUS at 08:45:00

## 2022-10-15 RX ADMIN — SODIUM CHLORIDE, SODIUM LACTATE, POTASSIUM CHLORIDE, CALCIUM CHLORIDE: 600; 310; 30; 20 INJECTION, SOLUTION INTRAVENOUS at 08:40:00

## 2022-10-15 RX ADMIN — ONDANSETRON 4 MG: 2 INJECTION INTRAMUSCULAR; INTRAVENOUS at 08:45:00

## 2022-10-15 NOTE — ANESTHESIA POSTPROCEDURE EVALUATION
[address]  Patient Status:  Hospital Outpatient Surgery   Age/Gender 44year old female MRN [id number]   [facility] SURGERY Attending Tarun Cisneros MD   Hosp Day # 0 PCP Shonna Lakhani MD       Anesthesia Post-op Note    TRUCLEAR HYSTEROSCOPY, POLYPECTOMY, dilation and curettage, removal of right vulvar skin tag    Procedure Summary     Date: 10/15/22 Room / Location: [facility] MAIN OR 05 / [facility] MAIN OR    Anesthesia Start: 0840 Anesthesia Stop: 5994    Procedure: Buren Sever HYSTEROSCOPY, POLYPECTOMY, dilation and curettage, removal of right vulvar skin tag (N/A Vagina ) Diagnosis:       Menometrorrhagia      Endometrial polyp      (Menometrorrhagia [Z15. 1]Endometrial polyp [N84.0])    Surgeons: Tarun Cisneros MD Anesthesiologist: Shannan Ramirez MD    Anesthesia Type: MAC ASA Status: 2          Anesthesia Type: MAC    Vitals Value Taken Time   /69 10/15/22 0932   Temp 98.3 10/15/22 0932   Pulse 86 10/15/22 0932   Resp 18 10/15/22 0932   SpO2 100 10/15/22 0932       Patient Location: Same Day Surgery    Anesthesia Type: MAC    Airway Patency: patent    Postop Pain Control: adequate    Mental Status: preanesthetic baseline    Nausea/Vomiting: none    Cardiopulmonary/Hydration status: stable euvolemic    Complications: no apparent anesthesia related complications    Postop vital signs: stable    Dental Exam: Unchanged from Preop    Patient to be discharged from PACU when criteria met.

## 2022-10-15 NOTE — OPERATIVE REPORT
OPERATIVE REPORT:     PATIENT: Rula Pino  MRN: [id number]  DATE OF PROCEDURE: 10/15/22    PRE-OP DIAGNOSIS:   3. 43 yo  with abnormal uterine bleeding  2. Suspected endometrial polyp  3. Vulvar skin tags     POST-OP DIAGNOSIS:   Same    PROCEDURE(S):   1. Dilation and curettage  2. Hysteroscopic polypectomy  3. Removal of skin tags    ANESTHESIA:  MAC and local    SURGEON(S): Dr.Kimberly Hernesto MD    ESTIMATED BLOOD LOSS: 10 mL           DRAINS: no UOP    UTERINE DISTENSION MEDIUM: Normal Saline 0.9%  DEFICIT: 10 mL     SPECIMENS:   1. Endometrial polyp  2. Skin tags           IMPLANTS: None           COMPLICATIONS:  None    FINDINGS:   1. Uterus with 1 cm posterior endometrial polyp, easily resected  2. Right labia with 2 small skin tags on labia majora, easily removed    INDICATIONS: see HPI    PROCEDURE: This procedure was fully reviewed with the patient and written informed consent was obtained after discussing risks, benefits, indication and alternatives. All questions were answered. The patient was taken to operative room and  MAC anesthesia was initiated. She was placed in dorsal lithotomy position. She was prepped and draped in normal sterile fashion. The skin tags were injected with local anesthestic. A sterile speculum was placed in the vagina. A single tooth tenaculum was used to grasp the anterior lip of the cervix. The cervix was gently dilated with hegar dilators to 6 mm. After the [employer] hysteroscope system was calibrated, the hysteroscope was then gently advanced into the endometrial cavity. The findings are noted above. The TRUCLEAR hysteroscopic rotary blade was then advanced through the hysteroscope under direct visualization. She was uncomfortable, so a paracervical block with 1% lidocaine was given    The TRUCLEAR hysteroscopic rotary blade simultaneously aspirated and cut the polypoid tissue without any complications.  This was repeat along the anterior, posterior and lateral surface of the endometrial cavity for a thorough sampling of the endometrium. This tissue was collected and sent to pathology. The TRUCLEAR hysteroscopic rotary blade and hysteroscope were then removed from the uterus. The single tooth tenaculum was removed and good hemostasis was noted with pressure and a single suture of 4-0 vicryl. Then, attention was turned to the skin tag removal. The two small skin tags were removed using a scalpel, and simple interrupted sutures x 3 were placed for hemostasis. Antibiotic ointment placed over the sites for lubrication. Sponge, lap, needle, and instrument counts correct by two counts. The patient was taken to recovery room in stable condition. She was instructed to avoid anything in vagina for two weeks.        DISPOSITION:  To recovery room in stable condition        CONDITION: Nany Lau FridayMD [facility] - OBJULY

## 2022-10-27 ENCOUNTER — OFFICE VISIT (OUTPATIENT)
Dept: OBGYN CLINIC | Facility: CLINIC | Age: 37
End: 2022-10-27
Payer: COMMERCIAL

## 2022-10-27 VITALS
DIASTOLIC BLOOD PRESSURE: 60 MMHG | WEIGHT: 141.38 LBS | HEIGHT: 66 IN | SYSTOLIC BLOOD PRESSURE: 108 MMHG | BODY MASS INDEX: 22.72 KG/M2

## 2022-10-27 DIAGNOSIS — N90.89 VULVAR LESION: Primary | ICD-10-CM

## 2022-10-27 PROBLEM — N84.0 ENDOMETRIAL POLYP: Status: RESOLVED | Noted: 2022-09-01 | Resolved: 2022-10-27

## 2022-10-27 PROCEDURE — 3078F DIAST BP <80 MM HG: CPT | Performed by: OBSTETRICS & GYNECOLOGY

## 2022-10-27 PROCEDURE — 3074F SYST BP LT 130 MM HG: CPT | Performed by: OBSTETRICS & GYNECOLOGY

## 2022-10-27 PROCEDURE — 3008F BODY MASS INDEX DOCD: CPT | Performed by: OBSTETRICS & GYNECOLOGY

## 2023-03-16 ENCOUNTER — TELEPHONE (OUTPATIENT)
Dept: SURGERY | Facility: CLINIC | Age: 38
End: 2023-03-16

## 2023-03-16 NOTE — TELEPHONE ENCOUNTER
Patient called to office with new increased right breast lump somewhat in the area that she has cysts. No acute new pain, no associated tenderness or swelling. Only noticed it was larger when changing bra. Offered appointment with Gwyn Cervantes on 4/6. Patient accepted.

## 2023-04-06 ENCOUNTER — OFFICE VISIT (OUTPATIENT)
Dept: SURGERY | Facility: CLINIC | Age: 38
End: 2023-04-06
Payer: COMMERCIAL

## 2023-04-06 VITALS
SYSTOLIC BLOOD PRESSURE: 120 MMHG | HEART RATE: 79 BPM | BODY MASS INDEX: 21.86 KG/M2 | OXYGEN SATURATION: 98 % | WEIGHT: 136 LBS | RESPIRATION RATE: 14 BRPM | HEIGHT: 66 IN | DIASTOLIC BLOOD PRESSURE: 81 MMHG

## 2023-04-06 DIAGNOSIS — N60.01 BENIGN BREAST CYST IN FEMALE, RIGHT: Primary | ICD-10-CM

## 2023-04-06 PROCEDURE — 3008F BODY MASS INDEX DOCD: CPT

## 2023-04-06 PROCEDURE — 99214 OFFICE O/P EST MOD 30 MIN: CPT

## 2023-04-06 PROCEDURE — 3074F SYST BP LT 130 MM HG: CPT

## 2023-04-06 PROCEDURE — 3079F DIAST BP 80-89 MM HG: CPT

## 2023-04-07 ENCOUNTER — HOSPITAL ENCOUNTER (OUTPATIENT)
Dept: MAMMOGRAPHY | Age: 38
Discharge: HOME | End: 2023-04-07
Payer: COMMERCIAL

## 2023-04-07 ENCOUNTER — HOSPITAL ENCOUNTER (OUTPATIENT)
Dept: ULTRASOUND IMAGING | Age: 38
Discharge: HOME | End: 2023-04-07
Payer: COMMERCIAL

## 2023-04-07 DIAGNOSIS — N60.01 BENIGN BREAST CYST IN FEMALE, RIGHT: ICD-10-CM

## 2023-04-07 PROCEDURE — 76642 ULTRASOUND BREAST LIMITED: CPT

## 2023-04-07 PROCEDURE — 77066 DX MAMMO INCL CAD BI: CPT

## 2023-04-07 PROCEDURE — 77062 BREAST TOMOSYNTHESIS BI: CPT

## 2023-04-24 ENCOUNTER — OFFICE VISIT (OUTPATIENT)
Dept: SURGERY | Facility: CLINIC | Age: 38
End: 2023-04-24
Payer: COMMERCIAL

## 2023-04-24 VITALS
HEART RATE: 112 BPM | WEIGHT: 136 LBS | RESPIRATION RATE: 15 BRPM | OXYGEN SATURATION: 100 % | BODY MASS INDEX: 22 KG/M2 | DIASTOLIC BLOOD PRESSURE: 81 MMHG | TEMPERATURE: 98 F | SYSTOLIC BLOOD PRESSURE: 130 MMHG

## 2023-04-24 DIAGNOSIS — N60.01 BENIGN BREAST CYST IN FEMALE, RIGHT: Primary | ICD-10-CM

## 2023-04-24 PROCEDURE — 3079F DIAST BP 80-89 MM HG: CPT | Performed by: SURGERY

## 2023-04-24 PROCEDURE — 3075F SYST BP GE 130 - 139MM HG: CPT | Performed by: SURGERY

## 2023-04-24 PROCEDURE — 99213 OFFICE O/P EST LOW 20 MIN: CPT | Performed by: SURGERY

## 2023-09-09 ENCOUNTER — OFFICE VISIT (OUTPATIENT)
Dept: FAMILY MEDICINE CLINIC | Facility: CLINIC | Age: 38
End: 2023-09-09
Payer: COMMERCIAL

## 2023-09-09 VITALS
DIASTOLIC BLOOD PRESSURE: 68 MMHG | RESPIRATION RATE: 16 BRPM | HEART RATE: 83 BPM | WEIGHT: 138 LBS | OXYGEN SATURATION: 97 % | HEIGHT: 66 IN | TEMPERATURE: 97 F | SYSTOLIC BLOOD PRESSURE: 102 MMHG | BODY MASS INDEX: 22.18 KG/M2

## 2023-09-09 DIAGNOSIS — Z00.00 ANNUAL PHYSICAL EXAM: Primary | ICD-10-CM

## 2023-09-09 DIAGNOSIS — E55.9 VITAMIN D DEFICIENCY: ICD-10-CM

## 2023-09-09 DIAGNOSIS — S93.601A SPRAIN OF RIGHT FOOT, INITIAL ENCOUNTER: ICD-10-CM

## 2023-09-09 LAB
ALBUMIN SERPL-MCNC: 4 G/DL (ref 3.4–5)
ALBUMIN/GLOB SERPL: 1.3 {RATIO} (ref 1–2)
ALP LIVER SERPL-CCNC: 47 U/L
ALT SERPL-CCNC: 22 U/L
ANION GAP SERPL CALC-SCNC: 5 MMOL/L (ref 0–18)
AST SERPL-CCNC: 16 U/L (ref 15–37)
BASOPHILS # BLD AUTO: 0.03 X10(3) UL (ref 0–0.2)
BASOPHILS NFR BLD AUTO: 0.7 %
BILIRUB SERPL-MCNC: 1.5 MG/DL (ref 0.1–2)
BUN BLD-MCNC: 15 MG/DL (ref 7–18)
CALCIUM BLD-MCNC: 8.6 MG/DL (ref 8.5–10.1)
CHLORIDE SERPL-SCNC: 108 MMOL/L (ref 98–112)
CHOLEST SERPL-MCNC: 120 MG/DL (ref ?–200)
CO2 SERPL-SCNC: 25 MMOL/L (ref 21–32)
CREAT BLD-MCNC: 1.1 MG/DL
EGFRCR SERPLBLD CKD-EPI 2021: 66 ML/MIN/1.73M2 (ref 60–?)
EOSINOPHIL # BLD AUTO: 0.16 X10(3) UL (ref 0–0.7)
EOSINOPHIL NFR BLD AUTO: 3.7 %
ERYTHROCYTE [DISTWIDTH] IN BLOOD BY AUTOMATED COUNT: 13.1 %
EST. AVERAGE GLUCOSE BLD GHB EST-MCNC: 77 MG/DL (ref 68–126)
FASTING PATIENT LIPID ANSWER: YES
FASTING STATUS PATIENT QL REPORTED: YES
GLOBULIN PLAS-MCNC: 3.2 G/DL (ref 2.8–4.4)
GLUCOSE BLD-MCNC: 89 MG/DL (ref 70–99)
HBA1C MFR BLD: 4.3 % (ref ?–5.7)
HCT VFR BLD AUTO: 38 %
HDLC SERPL-MCNC: 47 MG/DL (ref 40–59)
HGB BLD-MCNC: 13 G/DL
IMM GRANULOCYTES # BLD AUTO: 0 X10(3) UL (ref 0–1)
IMM GRANULOCYTES NFR BLD: 0 %
LDLC SERPL CALC-MCNC: 58 MG/DL (ref ?–100)
LYMPHOCYTES # BLD AUTO: 1.15 X10(3) UL (ref 1–4)
LYMPHOCYTES NFR BLD AUTO: 26.6 %
MCH RBC QN AUTO: 29.5 PG (ref 26–34)
MCHC RBC AUTO-ENTMCNC: 34.2 G/DL (ref 31–37)
MCV RBC AUTO: 86.4 FL
MONOCYTES # BLD AUTO: 0.33 X10(3) UL (ref 0.1–1)
MONOCYTES NFR BLD AUTO: 7.6 %
NEUTROPHILS # BLD AUTO: 2.66 X10 (3) UL (ref 1.5–7.7)
NEUTROPHILS # BLD AUTO: 2.66 X10(3) UL (ref 1.5–7.7)
NEUTROPHILS NFR BLD AUTO: 61.4 %
NONHDLC SERPL-MCNC: 73 MG/DL (ref ?–130)
OSMOLALITY SERPL CALC.SUM OF ELEC: 286 MOSM/KG (ref 275–295)
PLATELET # BLD AUTO: 178 10(3)UL (ref 150–450)
POTASSIUM SERPL-SCNC: 4.2 MMOL/L (ref 3.5–5.1)
PROT SERPL-MCNC: 7.2 G/DL (ref 6.4–8.2)
RBC # BLD AUTO: 4.4 X10(6)UL
SODIUM SERPL-SCNC: 138 MMOL/L (ref 136–145)
TRIGL SERPL-MCNC: 71 MG/DL (ref 30–149)
TSI SER-ACNC: 1.03 MIU/ML (ref 0.36–3.74)
VIT D+METAB SERPL-MCNC: 36.7 NG/ML (ref 30–100)
VLDLC SERPL CALC-MCNC: 10 MG/DL (ref 0–30)
WBC # BLD AUTO: 4.3 X10(3) UL (ref 4–11)

## 2023-09-09 PROCEDURE — 3078F DIAST BP <80 MM HG: CPT | Performed by: FAMILY MEDICINE

## 2023-09-09 PROCEDURE — 80061 LIPID PANEL: CPT | Performed by: FAMILY MEDICINE

## 2023-09-09 PROCEDURE — 85025 COMPLETE CBC W/AUTO DIFF WBC: CPT | Performed by: FAMILY MEDICINE

## 2023-09-09 PROCEDURE — 99395 PREV VISIT EST AGE 18-39: CPT | Performed by: FAMILY MEDICINE

## 2023-09-09 PROCEDURE — 80053 COMPREHEN METABOLIC PANEL: CPT | Performed by: FAMILY MEDICINE

## 2023-09-09 PROCEDURE — 83036 HEMOGLOBIN GLYCOSYLATED A1C: CPT | Performed by: FAMILY MEDICINE

## 2023-09-09 PROCEDURE — 3074F SYST BP LT 130 MM HG: CPT | Performed by: FAMILY MEDICINE

## 2023-09-09 PROCEDURE — 3008F BODY MASS INDEX DOCD: CPT | Performed by: FAMILY MEDICINE

## 2023-09-09 PROCEDURE — 82306 VITAMIN D 25 HYDROXY: CPT | Performed by: FAMILY MEDICINE

## 2023-09-09 PROCEDURE — 84443 ASSAY THYROID STIM HORMONE: CPT | Performed by: FAMILY MEDICINE

## 2023-10-13 ENCOUNTER — OFFICE VISIT (OUTPATIENT)
Dept: FAMILY MEDICINE CLINIC | Facility: CLINIC | Age: 38
End: 2023-10-13

## 2023-10-13 VITALS
DIASTOLIC BLOOD PRESSURE: 62 MMHG | SYSTOLIC BLOOD PRESSURE: 106 MMHG | HEART RATE: 74 BPM | TEMPERATURE: 98 F | OXYGEN SATURATION: 97 %

## 2023-10-13 DIAGNOSIS — G51.4 FACIAL TWITCHING: Primary | ICD-10-CM

## 2023-10-13 PROCEDURE — 3078F DIAST BP <80 MM HG: CPT | Performed by: FAMILY MEDICINE

## 2023-10-13 PROCEDURE — 3074F SYST BP LT 130 MM HG: CPT | Performed by: FAMILY MEDICINE

## 2023-10-13 PROCEDURE — 99213 OFFICE O/P EST LOW 20 MIN: CPT | Performed by: FAMILY MEDICINE

## 2023-10-24 ENCOUNTER — OFFICE VISIT (OUTPATIENT)
Dept: SURGERY | Facility: CLINIC | Age: 38
End: 2023-10-24

## 2023-10-24 VITALS
TEMPERATURE: 98 F | BODY MASS INDEX: 22.53 KG/M2 | OXYGEN SATURATION: 99 % | SYSTOLIC BLOOD PRESSURE: 125 MMHG | DIASTOLIC BLOOD PRESSURE: 81 MMHG | WEIGHT: 140.19 LBS | HEIGHT: 66 IN | RESPIRATION RATE: 16 BRPM | HEART RATE: 90 BPM

## 2023-10-24 DIAGNOSIS — N64.4 BREAST PAIN, RIGHT: Primary | ICD-10-CM

## 2023-10-24 DIAGNOSIS — N60.01 BENIGN BREAST CYST IN FEMALE, RIGHT: ICD-10-CM

## 2023-10-24 PROCEDURE — 76642 ULTRASOUND BREAST LIMITED: CPT | Performed by: SURGERY

## 2023-10-24 PROCEDURE — 3079F DIAST BP 80-89 MM HG: CPT | Performed by: SURGERY

## 2023-10-24 PROCEDURE — 99213 OFFICE O/P EST LOW 20 MIN: CPT | Performed by: SURGERY

## 2023-10-24 PROCEDURE — 3008F BODY MASS INDEX DOCD: CPT | Performed by: SURGERY

## 2023-10-24 PROCEDURE — 3074F SYST BP LT 130 MM HG: CPT | Performed by: SURGERY

## 2023-10-26 PROBLEM — N64.4 BREAST PAIN, RIGHT: Status: ACTIVE | Noted: 2023-10-26

## 2023-10-26 PROBLEM — N60.01 BENIGN BREAST CYST IN FEMALE, RIGHT: Status: ACTIVE | Noted: 2023-10-26

## 2024-01-25 ENCOUNTER — OFFICE VISIT (OUTPATIENT)
Dept: NEUROLOGY | Facility: CLINIC | Age: 39
End: 2024-01-25
Payer: COMMERCIAL

## 2024-01-25 VITALS — DIASTOLIC BLOOD PRESSURE: 68 MMHG | HEART RATE: 80 BPM | SYSTOLIC BLOOD PRESSURE: 118 MMHG | RESPIRATION RATE: 16 BRPM

## 2024-01-25 DIAGNOSIS — R25.3 TWITCHING: Primary | ICD-10-CM

## 2024-01-25 RX ORDER — ERGOCALCIFEROL 1.25 MG/1
50000 CAPSULE ORAL
Qty: 12 CAPSULE | Refills: 1 | Status: SHIPPED | OUTPATIENT
Start: 2024-01-25

## 2024-01-25 NOTE — PROGRESS NOTES
Patient here for evaluation of twitching. Started over the summer with tongue, progressed to lip, then legs/feet and back. Tongue twitching episodes have stopped and lip will only happen occasionally. Does still have episodes in legs/feet and back

## 2024-01-25 NOTE — PATIENT INSTRUCTIONS
Refill policies:    Allow 2-3 business days for refills; controlled substances may take longer.  Contact your pharmacy at least 5 days prior to running out of medication and have them send an electronic request or submit request through the “request refill” option in your [employer] account.  Refills are not addressed on weekends; covering physicians do not authorize routine medications on weekends.  No narcotics or controlled substances are refilled after noon on Fridays or by on call physicians.  By law, narcotics must be electronically prescribed.  A 30 day supply with no refills is the maximum allowed.  If your prescription is due for a refill, you may be due for a follow up appointment.  To best provide you care, patients receiving routine medications need to be seen at least once a year.  Patients receiving narcotic/controlled substance medications need to be seen at least once every 3 months.  In the event that your preferred pharmacy does not have the requested medication in stock (e.g. Backordered), it is your responsibility to find another pharmacy that has the requested medication available.  We will gladly send a new prescription to that pharmacy at your request.    Scheduling Tests:    If your physician has ordered radiology tests such as MRI or CT scans, please contact Central Scheduling at [phone] right away to schedule the test.  Once scheduled, the [facility] Centralized Referral Team will work with your insurance carrier to obtain pre-certification or prior authorization.  Depending on your insurance carrier, approval may take 3-10 days.  It is highly recommended patients assure they have received an authorization before having a test performed.  If test is done without insurance authorization, patient may be responsible for the entire amount billed.      Precertification and Prior Authorizations:  If your physician has recommended that you have a procedure or additional testing performed the [facility]  Centralized Referral Team will contact your insurance carrier to obtain pre-certification or prior authorization.    You are strongly encouraged to contact your insurance carrier to verify that your procedure/test has been approved and is a COVERED benefit.  Although the [facility] Centralized Referral Team does its due diligence, the insurance carrier gives the disclaimer that \"Although the procedure is authorized, this does not guarantee payment.\"    Ultimately the patient is responsible for payment.   Thank you for your understanding in this matter.  Paperwork Completion:  If you require FMLA or disability paperwork for your recovery, please make sure to either drop it off or have it faxed to our office at [phone]. Be sure the form has your name and date of birth on it.  The form will be faxed to our Forms Department and they will complete it for you.  There is a 25$ fee for all forms that need to be filled out.  Please be aware there is a 10-14 day turnaround time.  You will need to sign a release of information (ANJANA) form if your paperwork does not come with one.  You may call the Forms Department with any questions at [phone].  Their fax number is [phone].

## 2024-01-25 NOTE — PROGRESS NOTES
[facility]    Date of consult: 1/25/2024    Assessment:  Twitching /fasciculation    Plan:  MRI brain  EEG  Check labs  Vit D supplement ordered  Offered med if interested  See orders and medications filed with this encounter. The patient indicates understanding of these issues and agrees with the plan.  Discussed with patient/family regarding assessment, work up, care plan   RTC 2 months  Pt should go ER for any new or worsening symptoms and contact office     Subjective:   CC/Reason for consult: twitching   Consult Requested by  Flora Oshea MD    HPI: Alexus Mendez is a 38 year old female with past medical history as listed below presents here for initial evaluation of of twitching. Started over the summer with tongue, progressed to lip, then legs/feet and back. Tongue twitching episodes have stopped and lip will only happen occasionally. Does still have episodes in legs/feet and back.      History/Other:   REVIEW OF SYSTEMS:  A comprehensive 14-point system was reviewed. Pertinent positives and negatives are noted in HPI.       Current Outpatient Medications:     hydrocortisone 2.5 % External Ointment, Apply 1 Application topically 2 (two) times daily. Apply to eyelid twice daily, Disp: 30 g, Rfl: 1    triamcinolone 0.1 % External Cream, Apply 1 Application topically 2 (two) times daily. Apply to abdomen twice daily (Patient taking differently: Apply 1 Application topically as needed. Apply to abdomen twice daily), Disp: 80 g, Rfl: 2    Multiple Vitamin (MULTI-VITAMIN DAILY) Oral Tab, Take by mouth., Disp: , Rfl:   Allergies:  Allergies   Allergen Reactions    Adhesive Tape OTHER (SEE COMMENTS)     Skin burning, skin irritation with halter monitor leads especially    Ciprofloxacin OTHER (SEE COMMENTS)     palpitations    Macrobid [Nitrofurantoin] NAUSEA ONLY     Loose stool     Past Medical History:   Diagnosis Date    Anemia in pregnancy     Arrhythmia     SVT;treated  with adenosine    Contact dermatitis and other eczema, due to unspecified cause 3/9/2012    History of 2019 novel coronavirus disease (COVID-19) 04/29/2022    no hospitalization,symptoms: fatigue,fever,cough,headache    Hx of motion sickness     Nonspecific abnormal electrocardiogram (ECG) (EKG) 9/12/2011    continues to see cardiologist     Palpitations 9/12/2011    SVT (supraventricular tachycardia)      Past Surgical History:   Procedure Laterality Date    OTHER SURGICAL HISTORY      minsical tear and cyst removal     Social History:  Social History     Tobacco Use    Smoking status: Never    Smokeless tobacco: Never    Tobacco comments:     non-smoker   Substance Use Topics    Alcohol use: Not Currently     Comment: monthly     Family History   Problem Relation Age of Onset    Cancer Mother 57        lung nonsmoker    Heart Disease Maternal Grandfather     Other (SVT) Cousin         Maternal Cousin    Breast Cancer Other         mat gma sister      Objective:   Physical Examination:  /68   Pulse 80   Resp 16   LMP  (LMP Unknown)   General: Awake and alert; in no acute distress  HEENT: Eye sclerae are anicteric; scalp is atraumatic  Neck: Supple  Cardiac: Regular rate and regular rhythm  Lungs: Clear   Abdomen:  non-tender  Extremities: No clubbing or cyanosis; moves extremities   Psychiatric: Normal mood and affect; answers questions appropriately  Dermatologic: No rashes; no edema  Neurological Examination:  Language: normal   Speech: no dysarthria  CN: II-XII intact  Motor strength: 5/5 all extremities  Tone: normal  DTRs: brisk in knees  Coordination: Normal FTN  Sensory: symmetric   Gait: nl    Data Reviewed on 1/25/2024  Notes Reviewed on 1/25/2024  Labs Reviewed  on 1/25/2024    Herman De Jesus MD (Michael)   Neurology  [facility]  1/25/2024, 1:27 PM  Consultation Report: being sent/fax/route to requesting provider   CC: Flora Oshea MD

## 2024-01-27 ENCOUNTER — LAB ENCOUNTER (OUTPATIENT)
Dept: LAB | Age: 39
End: 2024-01-27
Attending: Other
Payer: COMMERCIAL

## 2024-01-27 DIAGNOSIS — R25.3 TWITCHING: ICD-10-CM

## 2024-01-27 LAB
ERYTHROCYTE [SEDIMENTATION RATE] IN BLOOD: 1 MM/HR
FOLATE SERPL-MCNC: 11 NG/ML (ref 8.7–?)
RHEUMATOID FACT SERPL-ACNC: <10 IU/ML (ref ?–15)
VIT B12 SERPL-MCNC: 455 PG/ML (ref 193–986)

## 2024-01-27 PROCEDURE — 82746 ASSAY OF FOLIC ACID SERUM: CPT

## 2024-01-27 PROCEDURE — 86147 CARDIOLIPIN ANTIBODY EA IG: CPT

## 2024-01-27 PROCEDURE — 84207 ASSAY OF VITAMIN B-6: CPT

## 2024-01-27 PROCEDURE — 82607 VITAMIN B-12: CPT

## 2024-01-27 PROCEDURE — 86235 NUCLEAR ANTIGEN ANTIBODY: CPT

## 2024-01-27 PROCEDURE — 85652 RBC SED RATE AUTOMATED: CPT

## 2024-01-27 PROCEDURE — 84425 ASSAY OF VITAMIN B-1: CPT

## 2024-01-27 PROCEDURE — 83921 ORGANIC ACID SINGLE QUANT: CPT

## 2024-01-27 PROCEDURE — 86431 RHEUMATOID FACTOR QUANT: CPT

## 2024-01-29 LAB
CARDIOLIPIN IGG SERPL-ACNC: 1.4 GPL (ref ?–10)
CARDIOLIPIN IGM SERPL-ACNC: 3.1 MPL (ref ?–10)
ENA SS-A IGG SER IA-ACNC: <0.4 U/ML
ENA SS-B IGG SER IA-ACNC: <0.4 U/ML

## 2024-01-30 LAB — CARDIOLIPIN IGA: <9 APL U/ML

## 2024-02-02 LAB — VITAMIN B6: 16.1 UG/L

## 2024-02-05 LAB
METHYLMALONIC ACID: 188 NMOL/L
VITAMIN B1 WHOLE BLD: 91.7 NMOL/L

## 2024-02-09 ENCOUNTER — HOSPITAL ENCOUNTER (OUTPATIENT)
Dept: MRI IMAGING | Age: 39
Discharge: HOME | End: 2024-02-09
Attending: Other
Payer: COMMERCIAL

## 2024-02-09 DIAGNOSIS — R25.3 TWITCHING: ICD-10-CM

## 2024-02-09 PROCEDURE — 70551 MRI BRAIN STEM W/O DYE: CPT | Performed by: OTHER

## 2024-02-14 ENCOUNTER — NURSE ONLY (OUTPATIENT)
Dept: ELECTROPHYSIOLOGY | Facility: HOSPITAL | Age: 39
End: 2024-02-14
Attending: Other
Payer: COMMERCIAL

## 2024-02-14 DIAGNOSIS — R25.3 TWITCHING: ICD-10-CM

## 2024-02-14 PROCEDURE — 95816 EEG AWAKE AND DROWSY: CPT | Performed by: OTHER

## 2024-02-15 NOTE — PROCEDURES
Date of Procedure: 2/14/2024    Procedure: EEG (ELECTROENCEPHALOGRAM)     TWITCHING  HISTORY: A 38 YEAR OLD FEMALE PRESENTS WITH COMPLAINTS OF TWITCHING IN HER TONGUE, FACE, LEGS AND BACK.  SYMPTOMS BEGAN IN SUMMER OF 2023. IT BEGAN IN HER TONGUE THEN PROGRESSED TO LIPS THEN TO LEGS/FEET AND INTO HER BACK.  THEY OCCUR RANDOMLY WITH NO KNOWN TRIGGERS. NO HISTORY OF SEIZURE  PAST MEDICAL HISTORY: COVID19, ARRHYTHMIA, SVT  PATIENT STATE: ALERT AND ORIENTED X 4  PATIENT STATE DURING EEG: AWAKE, DROWSY    BACKGROUND ACTIVITY: Posterior rhythm was in the range of 8-10 Hz, reactive to eye opening; symmetrical and synchronous. Noted also are  intermittent slowing of background activity. Drowsiness is characterized by intermittent theta waves bitemporally.   EPILEPTIFORM DISCHARGES: There were no epileptiform discharges or periodic lateralized epileptiform discharges (PLEDs) recorded throughout the recording.   HYPERVENTILATION: Hyperventilation was performed with no change.  PHOTIC STIMULATION: Photic stimulation was performed with no change.   Stage II sleep was not reached.    IMPRESSION: There were no electroclinical seizure or epileptiform discharges captured. Clinical correlation is advised.    Herman De Jesus MD (Michael)   Neurology  [facility]  2/15/2024, 9:53 AM  CC: Flora Oshea MD

## 2024-03-07 ENCOUNTER — OFFICE VISIT (OUTPATIENT)
Dept: NEUROLOGY | Facility: CLINIC | Age: 39
End: 2024-03-07
Payer: COMMERCIAL

## 2024-03-07 VITALS — SYSTOLIC BLOOD PRESSURE: 114 MMHG | RESPIRATION RATE: 16 BRPM | DIASTOLIC BLOOD PRESSURE: 64 MMHG | HEART RATE: 78 BPM

## 2024-03-07 DIAGNOSIS — R25.3 BENIGN FASCICULATIONS: Primary | ICD-10-CM

## 2024-03-07 PROCEDURE — 3078F DIAST BP <80 MM HG: CPT | Performed by: OTHER

## 2024-03-07 PROCEDURE — 3074F SYST BP LT 130 MM HG: CPT | Performed by: OTHER

## 2024-03-07 PROCEDURE — 99214 OFFICE O/P EST MOD 30 MIN: CPT | Performed by: OTHER

## 2024-03-07 RX ORDER — GLUCOSAMINE HCL 500 MG
TABLET ORAL DAILY
COMMUNITY

## 2024-03-07 NOTE — PATIENT INSTRUCTIONS
Refill policies:    Allow 2-3 business days for refills; controlled substances may take longer.  Contact your pharmacy at least 5 days prior to running out of medication and have them send an electronic request or submit request through the “request refill” option in your [employer] account.  Refills are not addressed on weekends; covering physicians do not authorize routine medications on weekends.  No narcotics or controlled substances are refilled after noon on Fridays or by on call physicians.  By law, narcotics must be electronically prescribed.  A 30 day supply with no refills is the maximum allowed.  If your prescription is due for a refill, you may be due for a follow up appointment.  To best provide you care, patients receiving routine medications need to be seen at least once a year.  Patients receiving narcotic/controlled substance medications need to be seen at least once every 3 months.  In the event that your preferred pharmacy does not have the requested medication in stock (e.g. Backordered), it is your responsibility to find another pharmacy that has the requested medication available.  We will gladly send a new prescription to that pharmacy at your request.    Scheduling Tests:    If your physician has ordered radiology tests such as MRI or CT scans, please contact Central Scheduling at [phone] right away to schedule the test.  Once scheduled, the [facility] Centralized Referral Team will work with your insurance carrier to obtain pre-certification or prior authorization.  Depending on your insurance carrier, approval may take 3-10 days.  It is highly recommended patients assure they have received an authorization before having a test performed.  If test is done without insurance authorization, patient may be responsible for the entire amount billed.      Precertification and Prior Authorizations:  If your physician has recommended that you have a procedure or additional testing performed the [facility]  Centralized Referral Team will contact your insurance carrier to obtain pre-certification or prior authorization.    You are strongly encouraged to contact your insurance carrier to verify that your procedure/test has been approved and is a COVERED benefit.  Although the [facility] Centralized Referral Team does its due diligence, the insurance carrier gives the disclaimer that \"Although the procedure is authorized, this does not guarantee payment.\"    Ultimately the patient is responsible for payment.   Thank you for your understanding in this matter.  Paperwork Completion:  If you require FMLA or disability paperwork for your recovery, please make sure to either drop it off or have it faxed to our office at [phone]. Be sure the form has your name and date of birth on it.  The form will be faxed to our Forms Department and they will complete it for you.  There is a 25$ fee for all forms that need to be filled out.  Please be aware there is a 10-14 day turnaround time.  You will need to sign a release of information (ANJANA) form if your paperwork does not come with one.  You may call the Forms Department with any questions at [phone].  Their fax number is [phone].

## 2024-03-07 NOTE — PROGRESS NOTES
[facility] Neurology Outpatient Progress Note  Date of service: 3/7/2024    Assessment:     ICD-10-CM    1. Benign fasciculations  R25.3           Plan:  MRI brain, EEG,  labs reviewed, normal study  Vit D supplement  Offered med if interested  See orders and medications filed with this encounter. The patient indicates understanding of these issues and agrees with the plan.  Discussed with patient/family regarding assessment, work up, care plan   RTC 9-12 months , monitor symptoms, 2nd opinion recommended if interested   Pt should go ER for any new or worsening symptoms and contact office .    Subjective:   History:  Patient here to follow up regarding twitching. Episodes remain unchanged since last visit.    Per initial visit note:  Alexus Mendez is a 38 year old female with past medical history as listed below presents here for initial evaluation of of twitching. Started over the summer with tongue, progressed to lip, then legs/feet and back. Tongue twitching episodes have stopped and lip will only happen occasionally. Does still have episodes in legs/feet and back.       History/Other:   REVIEW OF SYSTEMS:  A 10-point system was reviewed. Pertinent positives and negatives are noted as above       Current Outpatient Medications:     Cholecalciferol (VITAMIN D3) 75 MCG (3000 UT) Oral Tab, Take by mouth daily., Disp: , Rfl:     hydrocortisone 2.5 % External Ointment, Apply 1 Application topically 2 (two) times daily. Apply to eyelid twice daily, Disp: 30 g, Rfl: 1    triamcinolone 0.1 % External Cream, Apply 1 Application topically 2 (two) times daily. Apply to abdomen twice daily (Patient taking differently: Apply 1 Application topically as needed. Apply to abdomen twice daily), Disp: 80 g, Rfl: 2    Multiple Vitamin (MULTI-VITAMIN DAILY) Oral Tab, Take by mouth., Disp: , Rfl:   Allergies:  Allergies   Allergen Reactions    Adhesive Tape OTHER (SEE COMMENTS)     Skin burning, skin irritation with halter monitor  leads especially    Ciprofloxacin OTHER (SEE COMMENTS)     palpitations    Macrobid [Nitrofurantoin] NAUSEA ONLY     Loose stool     Past Medical History:   Diagnosis Date    Anemia in pregnancy (HCC)     Arrhythmia     SVT;treated with adenosine    Contact dermatitis and other eczema, due to unspecified cause 3/9/2012    History of 2019 novel coronavirus disease (COVID-19) 04/29/2022    no hospitalization,symptoms: fatigue,fever,cough,headache    Hx of motion sickness     Nonspecific abnormal electrocardiogram (ECG) (EKG) 9/12/2011    continues to see cardiologist     Palpitations 9/12/2011    SVT (supraventricular tachycardia)      Past Surgical History:   Procedure Laterality Date    OTHER SURGICAL HISTORY      minsical tear and cyst removal     Social History:  Social History     Tobacco Use    Smoking status: Never    Smokeless tobacco: Never    Tobacco comments:     non-smoker   Substance Use Topics    Alcohol use: Not Currently     Comment: monthly     Family History   Problem Relation Age of Onset    Cancer Mother 57        lung nonsmoker    Heart Disease Maternal Grandfather     Other (SVT) Cousin         Maternal Cousin    Breast Cancer Other         mat gma sister      Objective:   Neurological Examination:  /64   Pulse 78   Resp 16   LMP  (LMP Unknown)   Language: normal   Speech: no dysarthria  CN: II-XII intact  Motor strength: 5/5 all extremities  Tone: normal  DTRs: brisk in knees  Coordination: Normal FTN  Sensory: symmetric   Gait: nl    Test reviewed on 3/7/2024      Herman De Jesus MD (Michael)  Neurology  [facility]  3/7/2024, 4:40 PM  CC: Flora Oshea MD

## 2024-05-30 ENCOUNTER — OFFICE VISIT (OUTPATIENT)
Dept: OBGYN CLINIC | Facility: CLINIC | Age: 39
End: 2024-05-30

## 2024-05-30 VITALS
HEIGHT: 66 IN | HEART RATE: 91 BPM | BODY MASS INDEX: 22.18 KG/M2 | DIASTOLIC BLOOD PRESSURE: 64 MMHG | WEIGHT: 138 LBS | SYSTOLIC BLOOD PRESSURE: 100 MMHG

## 2024-05-30 DIAGNOSIS — Z12.4 SCREENING FOR CERVICAL CANCER: ICD-10-CM

## 2024-05-30 DIAGNOSIS — Z11.51 SCREENING FOR HUMAN PAPILLOMAVIRUS (HPV): ICD-10-CM

## 2024-05-30 DIAGNOSIS — N92.6 IRREGULAR MENSES: ICD-10-CM

## 2024-05-30 DIAGNOSIS — Z01.419 WELL WOMAN EXAM WITH ROUTINE GYNECOLOGICAL EXAM: Primary | ICD-10-CM

## 2024-05-30 PROCEDURE — 3078F DIAST BP <80 MM HG: CPT | Performed by: NURSE PRACTITIONER

## 2024-05-30 PROCEDURE — 99396 PREV VISIT EST AGE 40-64: CPT | Performed by: NURSE PRACTITIONER

## 2024-05-30 PROCEDURE — 3008F BODY MASS INDEX DOCD: CPT | Performed by: NURSE PRACTITIONER

## 2024-05-30 PROCEDURE — 87624 HPV HI-RISK TYP POOLED RSLT: CPT | Performed by: NURSE PRACTITIONER

## 2024-05-30 PROCEDURE — 3074F SYST BP LT 130 MM HG: CPT | Performed by: NURSE PRACTITIONER

## 2024-05-30 NOTE — PROGRESS NOTES
Subjective:  Chief Complaint   Patient presents with    Physical     Annual     39 year old female  presents for annual.    Pt with concerns of spotting 3-4 days before and after her menses  Also notes pressure in her lower pelvic region  Had these symptoms on the past and was diagnosed with endometrial polyps    Patient's last menstrual period was 2024 (exact date).  Hx Prior Abnormal Pap: No  Pap Date: 22  Pap Result Notes: wnl  Menarche: 15-16 (2024  8:07 AM)  Period Cycle (Days): 28-30 days (2024  8:07 AM)  Period Duration (Days): 3-4 days but spotting before and after period (2024  8:07 AM)  Period Flow: moderate (2024  8:07 AM)  Use of Birth Control (if yes, specify type): Vasectomy (2024  8:07 AM)  Hx Prior Abnormal Pap: No (2024  8:07 AM)  Pap Date: 22 (2024  8:07 AM)  Pap Result Notes: wnl (2024  8:07 AM)      Last Mammo:  2023, scheduled 6/3/2024    Denies family history of breast, ovarian and colon CA.    Feeling safe at home.    There is no immunization history on file for this patient.   reports that she has never smoked. She has never used smokeless tobacco.   reports that she does not currently use alcohol.    Past Medical History:    Anemia in pregnancy (HCC)    Arrhythmia    SVT;treated with adenosine    Contact dermatitis and other eczema, due to unspecified cause    History of 2019 novel coronavirus disease (COVID-19)    no hospitalization,symptoms: fatigue,fever,cough,headache    Hx of motion sickness    Nonspecific abnormal electrocardiogram (ECG) (EKG)    continues to see cardiologist     Palpitations    SVT (supraventricular tachycardia) (HCC)     Past Surgical History:   Procedure Laterality Date    Other surgical history      minsical tear and cyst removal       Review of Systems:  Pertinent items are noted in the HPI.    Objective:  /64   Pulse 91   Ht 66\"   Wt 138 lb (62.6 kg)   LMP 2024 (Exact Date)   BMI  22.27 kg/m²    Physical Examination:  General appearance: Well dressed, well nourished in no apparent distress  Neurologic/Psychiatric: Alert and oriented to person, place and time, mood normal, affect appropriate  Head: Normocephalic without obvious deformity, atraumatic  Neck: No thyromegaly, supple, non-tender, no masses, no adenopathy  Lungs: Clear to auscultation bilaterally, no rales, wheezes or rhonchi  Breasts: Symmetric, non-tender, no masses, lesions, retraction, dimpling or discharge bilaterally, no axillary or supraclavicular lymphadenopathy  Heart: Regular rate and rhythm, no gallops or murmurs  Abdomen: Soft, non-tender, non-distended, no masses, no hepatosplenomegaly, no hernias, no inguinal lymphadenopathy  Pelvic:    External genitalia- Normal, Bartholin's, urethra, skeins glands normal   Vagina- No vaginal lesions, physiologic discharge   Urethra- Non-tender, no masses   Urethral Meatus- No lesions or masses, no prolapse   Bladder- Non-tender, no masses   Cervix- No lesions, long/closed, no cervical motion tenderness   Uterus- Normal sized, non-tender, no masses   Adnexa-  Non-tender, no masses   Anus/Perineum- Normal, no masses or lesions  Extremities: Non-tender, full range of motion, no clubbing, cyanosis or edema  Skin:  General inspection- no rashes, lesions or discoloration  Pap smear obtained.    Assessment/Plan:  Normal well-woman exam.  Yearly mammogram scheduled      Declined chaperone for exam today     Diagnoses and all orders for this visit:    Well woman exam with routine gynecological exam  - self breast exam discussed and encouraged    Screening for cervical cancer  -     ThinPrep PAP Smear; Future  - ASCCP pap guidelines discussed, pt elects pap today    Screening for human papillomavirus (HPV)  -     Hpv Dna  High Risk , Thin Prep Collect; Future    Irregular menses  -     US TRANSVAG/ABDOMINAL EMG ONLY; Future  - will treat based on imaging results  - to follow up with  new/worsening symptoms         Return for GYN pelvic US with Marie.

## 2024-05-31 LAB — HPV I/H RISK 1 DNA SPEC QL NAA+PROBE: NEGATIVE

## 2024-06-03 ENCOUNTER — HOSPITAL ENCOUNTER (OUTPATIENT)
Dept: ULTRASOUND IMAGING | Age: 39
Discharge: HOME | End: 2024-06-03
Attending: SURGERY
Payer: COMMERCIAL

## 2024-06-03 ENCOUNTER — HOSPITAL ENCOUNTER (OUTPATIENT)
Dept: MAMMOGRAPHY | Age: 39
Discharge: HOME | End: 2024-06-03
Attending: SURGERY
Payer: COMMERCIAL

## 2024-06-03 DIAGNOSIS — N64.4 BREAST PAIN, RIGHT: ICD-10-CM

## 2024-06-03 PROCEDURE — 77062 BREAST TOMOSYNTHESIS BI: CPT | Performed by: SURGERY

## 2024-06-03 PROCEDURE — 76642 ULTRASOUND BREAST LIMITED: CPT | Performed by: SURGERY

## 2024-06-03 PROCEDURE — 77066 DX MAMMO INCL CAD BI: CPT | Performed by: SURGERY

## 2024-06-05 LAB
.: NORMAL
.: NORMAL

## 2024-06-12 ENCOUNTER — APPOINTMENT (OUTPATIENT)
Dept: OBGYN CLINIC | Facility: CLINIC | Age: 39
End: 2024-06-12
Payer: COMMERCIAL

## 2024-06-12 DIAGNOSIS — N92.6 IRREGULAR MENSES: ICD-10-CM

## 2024-06-12 PROCEDURE — 76830 TRANSVAGINAL US NON-OB: CPT | Performed by: OBSTETRICS & GYNECOLOGY

## 2024-06-12 PROCEDURE — 76856 US EXAM PELVIC COMPLETE: CPT | Performed by: OBSTETRICS & GYNECOLOGY

## 2024-07-02 ENCOUNTER — OFFICE VISIT (OUTPATIENT)
Dept: NEUROLOGY | Facility: CLINIC | Age: 39
End: 2024-07-02
Payer: COMMERCIAL

## 2024-07-02 VITALS
BODY MASS INDEX: 22.18 KG/M2 | RESPIRATION RATE: 16 BRPM | SYSTOLIC BLOOD PRESSURE: 107 MMHG | HEART RATE: 80 BPM | WEIGHT: 138 LBS | HEIGHT: 66 IN | DIASTOLIC BLOOD PRESSURE: 73 MMHG

## 2024-07-02 DIAGNOSIS — R25.3 FASCICULATIONS: Primary | ICD-10-CM

## 2024-07-02 DIAGNOSIS — R25.3 TWITCHING: ICD-10-CM

## 2024-07-02 DIAGNOSIS — R29.2 HYPER REFLEXIA: ICD-10-CM

## 2024-07-02 DIAGNOSIS — R20.0 NUMBNESS IN FEET: ICD-10-CM

## 2024-07-02 PROCEDURE — 3074F SYST BP LT 130 MM HG: CPT | Performed by: OTHER

## 2024-07-02 PROCEDURE — 3008F BODY MASS INDEX DOCD: CPT | Performed by: OTHER

## 2024-07-02 PROCEDURE — 99215 OFFICE O/P EST HI 40 MIN: CPT | Performed by: OTHER

## 2024-07-02 PROCEDURE — 3078F DIAST BP <80 MM HG: CPT | Performed by: OTHER

## 2024-07-02 NOTE — PATIENT INSTRUCTIONS
Refill policies:    Allow 2-3 business days for refills; controlled substances may take longer.  Contact your pharmacy at least 5 days prior to running out of medication and have them send an electronic request or submit request through the “request refill” option in your [employer] account.  Refills are not addressed on weekends; covering physicians do not authorize routine medications on weekends.  No narcotics or controlled substances are refilled after noon on Fridays or by on call physicians.  By law, narcotics must be electronically prescribed.  A 30 day supply with no refills is the maximum allowed.  If your prescription is due for a refill, you may be due for a follow up appointment.  To best provide you care, patients receiving routine medications need to be seen at least once a year.  Patients receiving narcotic/controlled substance medications need to be seen at least once every 3 months.  In the event that your preferred pharmacy does not have the requested medication in stock (e.g. Backordered), it is your responsibility to find another pharmacy that has the requested medication available.  We will gladly send a new prescription to that pharmacy at your request.    Scheduling Tests:    If your physician has ordered radiology tests such as MRI or CT scans, please contact Central Scheduling at [phone] right away to schedule the test.  Once scheduled, the [facility] Centralized Referral Team will work with your insurance carrier to obtain pre-certification or prior authorization.  Depending on your insurance carrier, approval may take 3-10 days.  It is highly recommended patients assure they have received an authorization before having a test performed.  If test is done without insurance authorization, patient may be responsible for the entire amount billed.      Precertification and Prior Authorizations:  If your physician has recommended that you have a procedure or additional testing performed the [facility]  Centralized Referral Team will contact your insurance carrier to obtain pre-certification or prior authorization.    You are strongly encouraged to contact your insurance carrier to verify that your procedure/test has been approved and is a COVERED benefit.  Although the [facility] Centralized Referral Team does its due diligence, the insurance carrier gives the disclaimer that \"Although the procedure is authorized, this does not guarantee payment.\"    Ultimately the patient is responsible for payment.   Thank you for your understanding in this matter.  Paperwork Completion:  If you require FMLA or disability paperwork for your recovery, please make sure to either drop it off or have it faxed to our office at [phone]. Be sure the form has your name and date of birth on it.  The form will be faxed to our Forms Department and they will complete it for you.  There is a 25$ fee for all forms that need to be filled out.  Please be aware there is a 10-14 day turnaround time.  You will need to sign a release of information (ANJANA) form if your paperwork does not come with one.  You may call the Forms Department with any questions at [phone].  Their fax number is [phone].

## 2024-07-02 NOTE — PROGRESS NOTES
[facility] Progress Note    HPI  Chief Complaint   Patient presents with    Neurologic Problem     Second Opinion full body and eye twitching,dizzzy at times    Test Results     MRI Brain 02/09/2024, EEG  02/14/2024 and labs 01/27/2024       Alexus Mendez is a 39 year old female, who presents for evaluation of 'twitching\" in body; she first noted in July Aug 2023.  She noted some twitching in the tongue for 3 beats and then stops- this was infrequent but progressed to involve arm, leg or other parts of body - she denies issues with speaking / swallowing      She states symptoms first started tongue then R face, lip and then R foot and calf; usually on R more than L side; may note cramping in toes on R or L foot but no sustained contraction othewise; thinks when thsi first occurred, she had fallen on foot    She showed a video which demonstrated calf fasciculations     Grandmother had Parkinson's disease - age 60s (head twitch); of note, she was previously seen by my neurology associate Dr JORGE De Jesus with extensive workup completed with normal EEG and normal labs but no EMG done.       Past Medical History:    Anemia in pregnancy (HCC)    Arrhythmia    SVT;treated with adenosine    Contact dermatitis and other eczema, due to unspecified cause    History of 2019 novel coronavirus disease (COVID-19)    no hospitalization,symptoms: fatigue,fever,cough,headache    Hx of motion sickness    Nonspecific abnormal electrocardiogram (ECG) (EKG)    continues to see cardiologist     Palpitations    SVT (supraventricular tachycardia) (HCC)     Past Surgical History:   Procedure Laterality Date    Other surgical history      minsical tear and cyst removal     Family History   Problem Relation Age of Onset    Cancer Mother 57        lung nonsmoker    Other (cancer lung) Mother     Heart Disease Maternal Grandfather     Other (SVT) Cousin         Maternal Cousin    Breast Cancer Other         mat gma  sister     Social History     Socioeconomic History    Marital status:    Tobacco Use    Smoking status: Never     Passive exposure: Never    Smokeless tobacco: Never    Tobacco comments:     non-smoker   Vaping Use    Vaping status: Never Used   Substance and Sexual Activity    Alcohol use: Not Currently     Comment: monthly    Drug use: No    Sexual activity: Yes     Partners: Male     Birth control/protection: Vasectomy   Other Topics Concern    Caffeine Concern Yes     Comment: 2 cups a day    Exercise No    Seat Belt Yes       Allergies   Allergen Reactions    Adhesive Tape OTHER (SEE COMMENTS)     Skin burning, skin irritation with halter monitor leads especially    Ciprofloxacin OTHER (SEE COMMENTS)     palpitations    Macrobid [Nitrofurantoin] NAUSEA ONLY     Loose stool         Current Outpatient Medications:     Cholecalciferol (VITAMIN D3) 75 MCG (3000 UT) Oral Tab, Take by mouth daily., Disp: , Rfl:     hydrocortisone 2.5 % External Ointment, Apply 1 Application topically 2 (two) times daily. Apply to eyelid twice daily, Disp: 30 g, Rfl: 1    triamcinolone 0.1 % External Cream, Apply 1 Application topically 2 (two) times daily. Apply to abdomen twice daily, Disp: 80 g, Rfl: 2    Multiple Vitamin (MULTI-VITAMIN DAILY) Oral Tab, Take by mouth., Disp: , Rfl:     Review of Systems:  No chest pain or palpitations; otherwise as noted in HPI.    Exam:  /73 (BP Location: Left arm, Patient Position: Sitting, Cuff Size: adult)   Pulse 80   Resp 16   Ht 66\"   Wt 138 lb (62.6 kg)   LMP 07/02/2024 (Exact Date)   BMI 22.27 kg/m²   Estimated body mass index is 22.27 kg/m² as calculated from the following:    Height as of this encounter: 66\".    Weight as of this encounter: 138 lb (62.6 kg).    Gen: well developed, well nourished, no acute distress  HEENT: normocephalic  Heart; normal S1/S2, regular rate and rhythm  Lungs: clear to auscultation bilaterally  Extremities: no edema, peripheral pulses  intact    Neck: supple, full range of motion; no carotid bruits    Fundoscopic Exam: optic discs sharp bilaterally    Neuro:  Mental status:  Orientation: Alert and oriented to person, place, time  Speech Fluent and conversational    CN: PERRL, EOMI with no nystagmus, VFF, smile symmetric, sensation intact, tongue and palate midline, SCM intact, otherwise, CN 2-12 intact  Motor: 5/5 strength throughout, tone normal  DTR: 3+ brisk but symmetric, throughout, toes downgoing bilaterally, no clonus  Sensory: intact to light touch throughout  Coord: FNF intact with no tremor or dysmetria; rapid alternating movements intact bilaterally  Romberg: absent  Gait: normal casual, heel, toe and tandem gait    Labs:  Reviewed in EMR  Component      Latest Ref Rng 1/27/2024   Cardiolipin IgG Antibody      <10 GPL 1.4    Cardiolipin IgM Antibody      <10 MPL 3.1    RHEUMATOID FACTOR      <15 IU/mL <10    SED RATE      0 - 20 mm/Hr 1    METHYLMALONIC ACID      0 - 378 nmol/L 188    VITAMIN B1 (THIAMINE), WHOLE B      66.5 - 200.0 nmol/L 91.7    Anti-SSA Antibody, IGG      <7 U/mL <0.4    Anti-SSB Antibody, IGG      <7 U/mL <0.4    VITAMIN B6      3.4 - 65.2 ug/L 16.1    Cardiolipin IgA      0 - 11 APL U/mL <9    Vitamin B12      193 - 986 pg/mL 455    FOLATE (FOLIC ACID), SERUM      >=8.7 ng/mL 11.0          Imaging:  Reviewed     MRI brain (2/9/2024):     FINDINGS:  The ventricles and sulci are within normal limits.  There is no midline shift or mass effect.  The basal cisterns are patent.  The gray-white matter differentiation is intact.  The craniocervical junction is unremarkable.       Minimal chronic microvascular ischemic changes in the cerebral white matter.     There is no acute intracranial hemorrhage or extra-axial fluid collection identified.  No significant abnormal parenchymal gradient susceptibility. There is no restricted diffusion to suggest acute ischemia/infarction.     The visualized paranasal sinuses and mastoid  air cells are unremarkable.  The expected major intracranial flow voids are present.                      Impression   CONCLUSION:       1. No acute intracranial abnormality identified.     2. Minimal chronic microvascular ischemic changes in the cerebral white matter.            Impression/ Plan:  Alexus Mendez is a 39 year old who presents for evaluation of 'twitching\" in body; she first noted in July Aug 2023.  She noted some twitching in the tongue for 3 beats and then stops- this was infrequent but progressed to involve arm, leg or other parts of body - she denies issues with speaking / swallowing      She states symptoms first started tongue then R face, lip and then R foot and calf; usually on R more than L side; may note cramping in toes on R or L foot but no sustained contraction othewise; thinks when thsi first occurred, she had fallen on foot    She showed a video which demonstrated calf fasciculations     Grandmother had Parkinson's disease - age 60s (head twitch); of note, she was previously seen by my neurology associate Dr JORGE De Jesus with extensive workup completed with normal EEG and normal labs but no EMG done.       Patient noted on exam to have brisk deep tendon reflexes but no fasciculations. MRI brain done showed no evidence for demyelinating disease and given symptoms of fasciculations reported, differential includes motor neuron disorder vs benign cramp fasciculation syndrome.  To evaluate further, will check NCS/EMG as well as MRI cervical spine.     1. Fasciculations  As noted above   - EMG (TIFFANIE MARIKA); Future  - MRI SPINE CERVICAL (W+WO) (CPT=72156) [[id number]]; Future    2. Hyper reflexia  As noted above   - EMG (TIFFANIE MARIKA); Future  - MRI SPINE CERVICAL (W+WO) (CPT=72156) [[id number]]; Future    3. Twitching  As noted above   - EMG (TIFFANIE MARIKA); Future  - MRI SPINE CERVICAL (W+WO) (CPT=72156) [[id number]]; Future    4. Numbness in feet  As noted above   - EMG (TIFFANIE  MARIKA); Future  - MRI SPINE CERVICAL (W+WO) (CPT=72156) [[id number]]; Future    Return in about 3 months (around 10/2/2024).    Roberto Mckenzie MD, Neurology  [facility]  Pager [phone]  7/2/2024

## 2024-07-31 ENCOUNTER — OFFICE VISIT (OUTPATIENT)
Dept: OBGYN CLINIC | Facility: CLINIC | Age: 39
End: 2024-07-31
Payer: COMMERCIAL

## 2024-07-31 VITALS
HEART RATE: 79 BPM | DIASTOLIC BLOOD PRESSURE: 72 MMHG | SYSTOLIC BLOOD PRESSURE: 104 MMHG | HEIGHT: 66 IN | WEIGHT: 136.38 LBS | BODY MASS INDEX: 21.92 KG/M2

## 2024-07-31 DIAGNOSIS — N92.6 IRREGULAR MENSES: Primary | ICD-10-CM

## 2024-07-31 PROCEDURE — 99213 OFFICE O/P EST LOW 20 MIN: CPT | Performed by: NURSE PRACTITIONER

## 2024-07-31 PROCEDURE — 3008F BODY MASS INDEX DOCD: CPT | Performed by: NURSE PRACTITIONER

## 2024-07-31 PROCEDURE — 3074F SYST BP LT 130 MM HG: CPT | Performed by: NURSE PRACTITIONER

## 2024-07-31 PROCEDURE — 3078F DIAST BP <80 MM HG: CPT | Performed by: NURSE PRACTITIONER

## 2024-07-31 NOTE — PROGRESS NOTES
Subjective:  39 year old    Chief Complaint   Patient presents with    Follow - Up     Ultrasound follow up and treatment     Pt here today with her , to follow up on US results  She is still having irregular vaginal bleeding  It is light and not bothering her too much    Review of Systems:  Pertinent items are noted in the HPI.    Objective:  /72   Pulse 79   Ht 66\"   Wt 136 lb 6 oz (61.9 kg)   LMP 2024 (Exact Date)       Physical Examination:  General appearance: Well dressed, well nourished in no apparent distress  Neurologic/Psychiatric: Alert and oriented to person, place and time, mood normal, affect appropriate      Assessment/Plan:      Diagnoses and all orders for this visit:    Irregular menses  - reviewed US results together  - discussed treatment of irregular menses  - pt was just concerned that she may have another polyp and since there were no polyps present would like to watch and wait  - to follow up with new/worsening symptoms or no improvement      Return if symptoms worsen or fail to improve.

## 2024-08-08 ENCOUNTER — TELEPHONE (OUTPATIENT)
Dept: NEUROLOGY | Facility: CLINIC | Age: 39
End: 2024-08-08

## 2024-08-08 NOTE — TELEPHONE ENCOUNTER
Received via Referral folder:    Hello,     Prior authorization for MRI SPINE CERVICAL (W+WO) (CPT=72156) has been denied by [facility].     Please note:     The clinical rationale provided by payer is based on past medical history, current signs and symptoms including the examination performed, previous imaging reports (including X-Ray, US, CT, etc.), previous care given - including conservative therapies (e.g. medication, therapies), any previous intervention reports (e.g. surgery, injections, etc.), and any lab / pathology results related to this Imaging request.       Denial rationale:     Your doctor told us that you have abnormal reflexes. Your doctor ordered an MRI of your neck. An MRI is a way to take pictures of the inside of your body. This test should be used when a physical exam by your doctor shows signs of nerve root damage in your neck. These signs could include certain types of abnormal reflexes, muscle weakness in your arms or loss of feeling due to nerve root damage. We reviewed the notes we have. The notes do not show that you have any signs of nerve root damage in your neck on the exam. Based on the information we have, this test is not medically necessary. We used [facility] Medical Benefits Management Clinical Guideline titled Imaging of the Spine to make this decision. You may view this guideline at [email or web address].     Please call [phone] Case reference #[id number] if you would like to discuss peer to peer/appeal options.     A copy of the denial rationale is located under Chart Review > Media > Document type 'Referral Attachment' Be sure to uncheck \"CLINICAL INFO ONLY\" to make viewable.     ~Please route any developments of this case to me as I may need to update the health plan, patient or facility departments.       Patient is scheduled 8/9/2024 Please advise.      Thank you,       Carla YBARRA   Referral Specialist   Centralized Prior Authorizations/Referrals   [facility]

## 2024-08-09 NOTE — TELEPHONE ENCOUNTER
Patient stated she was advised by our prior authorization team to contact our office if Dr. Mckenzie is going to do a peer to peer.    Advised will verify with Dr. Mckenzie.    Patient cancelled imaging appointment today due to denial.    Call patient to discuss and advise.

## 2024-08-09 NOTE — TELEPHONE ENCOUNTER
Called for [id number]    Approved    Auth #[id number] (same as case number)    Valid 8/1-9/29/2024

## 2024-08-09 NOTE — TELEPHONE ENCOUNTER
Spoke with Fidencio at [facility]. Found out that no appointment needed to set up a peer to peer review for denied MRI.     Call [phone]  Provide reference number #[id number]  Verify patient's name and birthday     Information routed to provider.

## 2024-08-12 ENCOUNTER — PROCEDURE VISIT (OUTPATIENT)
Dept: NEUROLOGY | Facility: CLINIC | Age: 39
End: 2024-08-12
Payer: COMMERCIAL

## 2024-08-12 DIAGNOSIS — R25.3 TWITCHING: ICD-10-CM

## 2024-08-12 DIAGNOSIS — R25.3 FASCICULATIONS: ICD-10-CM

## 2024-08-12 DIAGNOSIS — R29.2 HYPER REFLEXIA: ICD-10-CM

## 2024-08-12 DIAGNOSIS — R20.0 NUMBNESS IN FEET: ICD-10-CM

## 2024-08-12 PROCEDURE — 95910 NRV CNDJ TEST 7-8 STUDIES: CPT | Performed by: OTHER

## 2024-08-12 PROCEDURE — 95886 MUSC TEST DONE W/N TEST COMP: CPT | Performed by: OTHER

## 2024-08-12 NOTE — PROCEDURES
[facility]  [address], [place] [address]   [phone]        Full Name: JEROD DELANEY Gender: Female  Patient ID: [id number] YOB: 1985      Visit Date: 8/12/2024 8:27 AM  Age: 39 Years  Examining Physician: JONATHAN TOM MD  Height: 5 feet 6 inch  Weight: 136 lbs  History:     Focused HPI:   This is a 39 year old who presents for evaluation of 'twitching\" in body; she first noted in July Aug 2023.  She noted some twitching in the tongue for 3 beats and then stops- this was infrequent but progressed to involve arm, leg or other parts of body - she denies issues with speaking / swallowing       She states symptoms first started tongue then R face, lip and then R foot and calf; usually on R more than L side; may note cramping in toes on R or L foot but no sustained contraction othewise; thinks when thsi first occurred, she had fallen on foot     She showed a video which demonstrated calf fasciculations      Grandmother had Parkinson's disease - age 60s (head twitch); of note, she was previously seen by my neurology associate Dr JORGE De Jesus with extensive workup completed with normal EEG and normal labs but no EMG done.        Patient noted on exam to have brisk deep tendon reflexes but no fasciculations. MRI brain done showed no evidence for demyelinating disease and given symptoms of fasciculations reported, differential includes motor neuron disorder vs benign cramp fasciculation syndrome.  To evaluate further, will check NCS/EMG requested    Focused Exam:   Motor: 5/5 strength throughout, tone normal  DTR: 3+ brisk but symmetric, throughout, toes downgoing bilaterally, no clonus  Sensory: intact to light touch throughout      Sensory NCS      Nerve / Sites Rec. Site Onset Lat Peak Lat NP Amp PP Amp Segments Distance Velocity Comment     ms ms µV µV  cm m/s    R Median - Dig II (Antidromic)      Wrist Index 2.60 3.59 60.5 107.2 Wrist - Index 13  50       Ref.  ?3.30 ?4.00 ?17.0 ?19.0 Ref.      R Ulnar - Dig V (Antidromic)      Wrist Dig V 2.34 3.23 60.0 83.8 Wrist - Dig V 12 51       Ref.  ?3.10 ?4.00 ?14.0 ?13.0 Ref.      R Radial - Superficial (Antidromic)      Forearm Wrist 1.56 2.40 54.7 52.2 Forearm - Wrist 10 64       Ref.  ?2.20 ?2.80 ?7.0 ?11.0 Ref.      R Sural - (Antidromic)      Calf Ankle 2.97 3.75 16.0 22.4 Calf - Ankle 14 47       Ref.  ?3.60 ?4.50 ?4.0 ?4.0 Ref.          Motor NCS      Nerve / Sites Muscle Latency Amplitude Segments Dist. Lat Diff Velocity Comments     ms mV  cm ms m/s    R Median - APB      Wrist APB 3.13 8.6 Wrist - APB 7         Ref.  ?4.40 ?4.2 Ref.          Elbow APB 7.02 9.2 Elbow - Wrist 23.5 3.90 60.3       Ref.    Ref.   ?51.0    R Ulnar - ADM      Wrist ADM 2.77 12.7 Wrist - ADM 7         Ref.  ?3.70 ?7.9 Ref.          B.Elbow ADM 6.25 11.9 B.Elbow - Wrist 20.5 3.48 58.9       Ref.    Ref.   ?52.0    R Peroneal - EDB      Ankle EDB 7.65 2.1 Ankle - EDB 7         Ref.  ?6.50 ?1.1 Ref.          B. Fib Head EDB 15.10 2.0 B. Fib Head - Ankle 32 7.46 42.9       Ref.    Ref.   ?39.0    R Tibial - AH      Ankle AH 4.38 20.0 Ankle - AH 8         Ref.  ?6.10 ?5.3 Ref.          Knee AH 12.54 15.4 Knee - Ankle 38 8.17 46.5       Ref.    Ref.   ?42.0        F  Wave      Nerve M Latency F Latency    ms ms   R Peroneal - EDB 8.4 54.2   Ref.  ?58.8   R Tibial - AH 4.9 47.8   Ref.  ?57.5   R Median - APB 3.5 25.6   Ref.  ?28.5   R Ulnar - ADM 3.2 26.4   Ref.  ?30.2       EMG Summary Table     Spontaneous MUAP Recruitment   Muscle IA Fib PSW Fasc H.F. Amp Dur. PPP Pattern   R. Deltoid N None None None None N N N N   R. Triceps brachii N None None None None N N N N   R. Biceps brachii N None None None None N N N N   R. Extensor digitorum communis N None None None None N N N N   R. First dorsal interosseous N None None None None N N N N   R. Vastus medialis N None None None None N N N N   R. Peroneus longus N None None None None N N N  N   R. Tibialis anterior N None None None None N N N N   R. Gastrocnemius N None None None None N N N N   R. Extensor hallucis longus N None None None None N N N N       Summary    The motor conduction test was performed on 4 nerve(s). The results were normal in 3 nerve(s): R Median - APB, R Ulnar - ADM, R Tibial - AH. Results outside the specified normal range were found in 1 nerve(s), as follows:  In the R Peroneal - EDB study  the take off latency result was increased for Ankle stimulation    The sensory conduction test was normal in all 4 of the tested nerves: R Median - Dig II (Antidromic), R Ulnar - Dig V (Antidromic), R Radial - Superficial (Antidromic), R Sural - (Antidromic).    The F wave study was unremarkable in all 4 of the tested nerves: R Peroneal - EDB, R Tibial - AH, R Median - APB, R Ulnar - ADM    The needle EMG study was normal in all 10 tested muscles: R. Deltoid, R. Triceps brachii, R. Biceps brachii, R. Extensor digitorum communis, R. First dorsal interosseous, R. Vastus medialis, R. Peroneus longus, R. Tibialis anterior, R. Gastrocnemius, R. Extensor hallucis longus.        Conclusion:   This is a borderline abnormal study due to increased distal motor latency of right common peroneal nerve; this is an isolated finding of unclear etiology but could suggest a chronic lumbosacral radiculopathy.     There is, however, no evidence for an underlying large fiber polyneuropathy, primary demyelinating neuropathy or myopathy or motor neuropathy as clinically questioned.     Roberto Mckenzie MD, Neurology  [facility]  Pager [phone]  8/12/2024

## 2024-09-16 ENCOUNTER — HOSPITAL ENCOUNTER (OUTPATIENT)
Dept: MRI IMAGING | Facility: HOSPITAL | Age: 39
Discharge: HOME | End: 2024-09-16
Attending: Other
Payer: COMMERCIAL

## 2024-09-16 DIAGNOSIS — R25.3 TWITCHING: ICD-10-CM

## 2024-09-16 DIAGNOSIS — R29.2 HYPER REFLEXIA: ICD-10-CM

## 2024-09-16 DIAGNOSIS — R20.0 NUMBNESS IN FEET: ICD-10-CM

## 2024-09-16 DIAGNOSIS — R25.3 FASCICULATIONS: ICD-10-CM

## 2024-09-16 PROCEDURE — 72156 MRI NECK SPINE W/O & W/DYE: CPT | Performed by: OTHER

## 2024-09-16 PROCEDURE — A9575 INJ GADOTERATE MEGLUMI 0.1ML: HCPCS | Performed by: OTHER

## 2024-09-16 RX ORDER — GADOTERATE MEGLUMINE 376.9 MG/ML
15 INJECTION INTRAVENOUS
Status: COMPLETED | OUTPATIENT
Start: 2024-09-16 | End: 2024-09-16

## 2024-09-16 RX ADMIN — GADOTERATE MEGLUMINE 12 ML: 376.9 INJECTION INTRAVENOUS at 21:47:00

## 2024-10-02 ENCOUNTER — OFFICE VISIT (OUTPATIENT)
Dept: NEUROLOGY | Facility: CLINIC | Age: 39
End: 2024-10-02
Payer: COMMERCIAL

## 2024-10-02 VITALS
HEART RATE: 86 BPM | WEIGHT: 136 LBS | HEIGHT: 66 IN | DIASTOLIC BLOOD PRESSURE: 72 MMHG | BODY MASS INDEX: 21.86 KG/M2 | SYSTOLIC BLOOD PRESSURE: 100 MMHG | RESPIRATION RATE: 16 BRPM

## 2024-10-02 DIAGNOSIS — R29.2 HYPER REFLEXIA: ICD-10-CM

## 2024-10-02 DIAGNOSIS — R25.3 FASCICULATIONS: Primary | ICD-10-CM

## 2024-10-02 DIAGNOSIS — R25.3 TWITCHING: ICD-10-CM

## 2024-10-02 DIAGNOSIS — R20.0 NUMBNESS IN FEET: ICD-10-CM

## 2024-10-02 PROCEDURE — 3074F SYST BP LT 130 MM HG: CPT | Performed by: OTHER

## 2024-10-02 PROCEDURE — 99214 OFFICE O/P EST MOD 30 MIN: CPT | Performed by: OTHER

## 2024-10-02 PROCEDURE — 3078F DIAST BP <80 MM HG: CPT | Performed by: OTHER

## 2024-10-02 PROCEDURE — 3008F BODY MASS INDEX DOCD: CPT | Performed by: OTHER

## 2024-10-02 NOTE — PATIENT INSTRUCTIONS
Refill policies:    Allow 2-3 business days for refills; controlled substances may take longer.  Contact your pharmacy at least 5 days prior to running out of medication and have them send an electronic request or submit request through the “request refill” option in your [employer] account.  Refills are not addressed on weekends; covering physicians do not authorize routine medications on weekends.  No narcotics or controlled substances are refilled after noon on Fridays or by on call physicians.  By law, narcotics must be electronically prescribed.  A 30 day supply with no refills is the maximum allowed.  If your prescription is due for a refill, you may be due for a follow up appointment.  To best provide you care, patients receiving routine medications need to be seen at least once a year.  Patients receiving narcotic/controlled substance medications need to be seen at least once every 3 months.  In the event that your preferred pharmacy does not have the requested medication in stock (e.g. Backordered), it is your responsibility to find another pharmacy that has the requested medication available.  We will gladly send a new prescription to that pharmacy at your request.    Scheduling Tests:    If your physician has ordered radiology tests such as MRI or CT scans, please contact Central Scheduling at [phone] right away to schedule the test.  Once scheduled, the [facility] Centralized Referral Team will work with your insurance carrier to obtain pre-certification or prior authorization.  Depending on your insurance carrier, approval may take 3-10 days.  It is highly recommended patients assure they have received an authorization before having a test performed.  If test is done without insurance authorization, patient may be responsible for the entire amount billed.      Precertification and Prior Authorizations:  If your physician has recommended that you have a procedure or additional testing performed the [facility]  Centralized Referral Team will contact your insurance carrier to obtain pre-certification or prior authorization.    You are strongly encouraged to contact your insurance carrier to verify that your procedure/test has been approved and is a COVERED benefit.  Although the [facility] Centralized Referral Team does its due diligence, the insurance carrier gives the disclaimer that \"Although the procedure is authorized, this does not guarantee payment.\"    Ultimately the patient is responsible for payment.   Thank you for your understanding in this matter.  Paperwork Completion:  If you require FMLA or disability paperwork for your recovery, please make sure to either drop it off or have it faxed to our office at [phone]. Be sure the form has your name and date of birth on it.  The form will be faxed to our Forms Department and they will complete it for you.  There is a 25$ fee for all forms that need to be filled out.  Please be aware there is a 10-14 day turnaround time.  You will need to sign a release of information (ANJANA) form if your paperwork does not come with one.  You may call the Forms Department with any questions at [phone].  Their fax number is [phone].

## 2024-10-02 NOTE — PROGRESS NOTES
[facility] Progress Note    HPI  Chief Complaint   Patient presents with    Neurologic Problem     Fasciculations F/U, reports no change in Sx's    Test Results     MRI C Spine 9/16, EMG 8/21       Initial notes as per 7/2/2024,  \"  Alexus Mendez is a 39 year old female, who presents for evaluation of 'twitching\" in body; she first noted in July Aug 2023.  She noted some twitching in the tongue for 3 beats and then stops- this was infrequent but progressed to involve arm, leg or other parts of body - she denies issues with speaking / swallowing      She states symptoms first started tongue then R face, lip and then R foot and calf; usually on R more than L side; may note cramping in toes on R or L foot but no sustained contraction othewise; thinks when thsi first occurred, she had fallen on foot    She showed a video which demonstrated calf fasciculations     Grandmother had Parkinson's disease - age 60s (head twitch); of note, she was previously seen by my neurology associate Dr JORGE De Jesus with extensive workup completed with normal EEG and normal labs but no EMG done. \"       Patient since last visit denies any worsening; she continues to feel generally clumsy. She denies swallowing issues but feels she has twitching in face and other parts of her body. She denies pain in eyes or vision loss or double vision but states she may have intermittent \"swelling\" of her eyes especially when she places things near her face. She has not noted a correlation in terms of diet and her symptoms of twitching in the body.  She denies dropping objects from hands and denies any falls or tripping over her feet.  She feels she has had some intermittent cramping in her calves but this has been brief.  She does admit to increased stress over the past year and denies being treated with antianxiety medications in the past.  She is concerned about risk of drowsiness with any medication to treat muscle twitching.   Of note, she is also planning to see allergy and immunology specialist in the future.         Past Medical History:    Anemia in pregnancy (HCC)    Arrhythmia    SVT;treated with adenosine    Contact dermatitis and other eczema, due to unspecified cause    History of 2019 novel coronavirus disease (COVID-19)    no hospitalization,symptoms: fatigue,fever,cough,headache    Hx of motion sickness    Nonspecific abnormal electrocardiogram (ECG) (EKG)    continues to see cardiologist     Palpitations    SVT (supraventricular tachycardia) (HCC)     Past Surgical History:   Procedure Laterality Date    Other surgical history      minsical tear and cyst removal     Family History   Problem Relation Age of Onset    Cancer Mother 57        lung nonsmoker    Other (cancer lung) Mother     Heart Disease Maternal Grandfather     Other (SVT) Cousin         Maternal Cousin    Breast Cancer Other         mat gma sister     Social History     Socioeconomic History    Marital status:    Tobacco Use    Smoking status: Never     Passive exposure: Never    Smokeless tobacco: Never    Tobacco comments:     non-smoker   Vaping Use    Vaping status: Never Used   Substance and Sexual Activity    Alcohol use: Not Currently     Comment: monthly    Drug use: No    Sexual activity: Yes     Partners: Male     Birth control/protection: Vasectomy   Other Topics Concern    Caffeine Concern Yes     Comment: 2 cups a day    Exercise No    Seat Belt Yes       Allergies   Allergen Reactions    Adhesive Tape OTHER (SEE COMMENTS)     Skin burning, skin irritation with halter monitor leads especially    Ciprofloxacin OTHER (SEE COMMENTS)     palpitations    Macrobid [Nitrofurantoin] NAUSEA ONLY     Loose stool         Current Outpatient Medications:     hydrocortisone 2.5 % External Ointment, Apply 1 Application topically 2 (two) times daily. Apply to eyelid twice daily, Disp: 30 g, Rfl: 1    triamcinolone 0.1 % External Cream, Apply 1 Application  topically 2 (two) times daily. Apply to abdomen twice daily, Disp: 80 g, Rfl: 2    Multiple Vitamin (MULTI-VITAMIN DAILY) Oral Tab, Take by mouth., Disp: , Rfl:     Review of Systems:  No chest pain or palpitations; otherwise as noted in HPI.    Exam:  /72 (BP Location: Left arm, Patient Position: Sitting, Cuff Size: adult)   Pulse 86   Resp 16   Ht 66\"   Wt 136 lb (61.7 kg)   LMP 09/25/2024 (Approximate)   BMI 21.95 kg/m²   Estimated body mass index is 21.95 kg/m² as calculated from the following:    Height as of this encounter: 66\".    Weight as of this encounter: 136 lb (61.7 kg).    Gen: well developed, well nourished, no acute distress  HEENT: normocephalic  Heart; normal S1/S2, regular rate and rhythm  Lungs: clear to auscultation bilaterally  Extremities: no edema, peripheral pulses intact    Neck: supple, full range of motion; no carotid bruits    Fundoscopic Exam: optic discs sharp bilaterally    Neuro:  Mental status:  Orientation: Alert and oriented to person, place, time  Speech Fluent and conversational    CN: PERRL, EOMI with no nystagmus, VFF, smile symmetric, sensation intact, tongue and palate midline, SCM intact, otherwise, CN 2-12 intact  Motor: 5/5 strength throughout, tone normal  DTR: 3+ brisk but symmetric, throughout, toes downgoing bilaterally, no clonus  Sensory: intact to light touch throughout  Coord: FNF intact with no tremor or dysmetria; rapid alternating movements intact bilaterally  Romberg: absent  Gait: normal casual, heel, toe and tandem gait    Labs:  None new    Prior as noted below    Reviewed in EMR  Component      Latest Ref Rng 1/27/2024   Cardiolipin IgG Antibody      <10 GPL 1.4    Cardiolipin IgM Antibody      <10 MPL 3.1    RHEUMATOID FACTOR      <15 IU/mL <10    SED RATE      0 - 20 mm/Hr 1    METHYLMALONIC ACID      0 - 378 nmol/L 188    VITAMIN B1 (THIAMINE), WHOLE B      66.5 - 200.0 nmol/L 91.7    Anti-SSA Antibody, IGG      <7 U/mL <0.4    Anti-SSB  Antibody, IGG      <7 U/mL <0.4    VITAMIN B6      3.4 - 65.2 ug/L 16.1    Cardiolipin IgA      0 - 11 APL U/mL <9    Vitamin B12      193 - 986 pg/mL 455    FOLATE (FOLIC ACID), SERUM      >=8.7 ng/mL 11.0          Imaging:  New    MRI cervical spine with and without contrast (9/16/2024):  FINDINGS:    CERVICAL DISC LEVELS:  C2-C3:  No significant disc/facet abnormality, spinal stenosis, or foraminal narrowing.  C3-C4:  No significant disc/facet abnormality, spinal stenosis, or foraminal narrowing.  C4-C5:  No significant disc/facet abnormality, spinal stenosis, or foraminal narrowing.  C5-C6:  Minimal degenerative disc bulge/osteophyte complex without significant central or foraminal stenosis.  The facet joints are unremarkable.  C6-C7:  No significant disc/facet abnormality, spinal stenosis, or foraminal narrowing.  C7-T1:  No significant disc/facet abnormality, spinal stenosis, or foraminal narrowing.          CRANIOCERVICAL AREA:  Normal foramen magnum with no Chiari malformation.    PARASPINAL AREA:  Normal with no visible mass.    BONY STRUCTURES:  No fracture, pars defect, or osseous lesion.    CORD:  Normal caliber, contour and signal intensity.                     Impression   CONCLUSION:  Minimal degenerative disc disease at C5-6.  Otherwise, unremarkable MRI of the cervical spine.     Independently reviewed: No evidence for significant spinal canal stenosis, or intramedullary signal change, T2/FLAIR hyperintensities within the spinal cord; mild degenerative disc disease not causing significant spinal canal stenosis; no suggestion for demyelinating disease; otherwise agree with report as above;     Reviewed     MRI brain (2/9/2024):     FINDINGS:  The ventricles and sulci are within normal limits.  There is no midline shift or mass effect.  The basal cisterns are patent.  The gray-white matter differentiation is intact.  The craniocervical junction is unremarkable.       Minimal chronic microvascular  ischemic changes in the cerebral white matter.     There is no acute intracranial hemorrhage or extra-axial fluid collection identified.  No significant abnormal parenchymal gradient susceptibility. There is no restricted diffusion to suggest acute ischemia/infarction.     The visualized paranasal sinuses and mastoid air cells are unremarkable.  The expected major intracranial flow voids are present.                      Impression   CONCLUSION:       1. No acute intracranial abnormality identified.     2. Minimal chronic microvascular ischemic changes in the cerebral white matter.     Other procedures    New    NCS/EMG (8/12/2024):     Sensory NCS      Nerve / Sites Rec. Site Onset Lat Peak Lat NP Amp PP Amp Segments Distance Velocity Comment       ms ms µV µV   cm m/s     R Median - Dig II (Antidromic)      Wrist Index 2.60 3.59 60.5 107.2 Wrist - Index 13 50        Ref.   <=3.30 <=4.00 >=17.0 >=19.0 Ref.         R Ulnar - Dig V (Antidromic)      Wrist Dig V 2.34 3.23 60.0 83.8 Wrist - Dig V 12 51        Ref.   <=3.10 <=4.00 >=14.0 >=13.0 Ref.         R Radial - Superficial (Antidromic)      Forearm Wrist 1.56 2.40 54.7 52.2 Forearm - Wrist 10 64        Ref.   <=2.20 <=2.80 >=7.0 >=11.0 Ref.         R Sural - (Antidromic)      Calf Ankle 2.97 3.75 16.0 22.4 Calf - Ankle 14 47        Ref.   <=3.60 <=4.50 >=4.0 >=4.0 Ref.             Motor NCS      Nerve / Sites Muscle Latency Amplitude Segments Dist. Lat Diff Velocity Comments       ms mV   cm ms m/s     R Median - APB      Wrist APB 3.13 8.6 Wrist - APB 7            Ref.   <=4.40 >=4.2 Ref.              Elbow APB 7.02 9.2 Elbow - Wrist 23.5 3.90 60.3        Ref.       Ref.     >=51.0     R Ulnar - ADM      Wrist ADM 2.77 12.7 Wrist - ADM 7            Ref.   <=3.70 >=7.9 Ref.              B.Elbow ADM 6.25 11.9 B.Elbow - Wrist 20.5 3.48 58.9        Ref.       Ref.     >=52.0     R Peroneal - EDB      Ankle EDB 7.65 2.1 Ankle - EDB 7            Ref.   <=6.50 >=1.1  Ref.              B. Fib Head EDB 15.10 2.0 B. Fib Head - Ankle 32 7.46 42.9        Ref.       Ref.     >=39.0     R Tibial - AH      Ankle AH 4.38 20.0 Ankle - AH 8            Ref.   <=6.10 >=5.3 Ref.              Knee AH 12.54 15.4 Knee - Ankle 38 8.17 46.5        Ref.       Ref.     >=42.0         F  Wave      Nerve M Latency F Latency     ms ms   R Peroneal - EDB 8.4 54.2   Ref.   <=58.8   R Tibial - AH 4.9 47.8   Ref.   <=57.5   R Median - APB 3.5 25.6   Ref.   <=28.5   R Ulnar - ADM 3.2 26.4   Ref.   <=30.2                   EMG Summary Table       Spontaneous MUAP Recruitment   Muscle IA Fib PSW Fasc H.F. Amp Dur. PPP Pattern   R. Deltoid N None None None None N N N N   R. Triceps brachii N None None None None N N N N   R. Biceps brachii N None None None None N N N N   R. Extensor digitorum communis N None None None None N N N N   R. First dorsal interosseous N None None None None N N N N   R. Vastus medialis N None None None None N N N N   R. Peroneus longus N None None None None N N N N   R. Tibialis anterior N None None None None N N N N   R. Gastrocnemius N None None None None N N N N   R. Extensor hallucis longus N None None None None N N N N       Summary     The motor conduction test was performed on 4 nerve(s). The results were normal in 3 nerve(s): R Median - APB, R Ulnar - ADM, R Tibial - AH. Results outside the specified normal range were found in 1 nerve(s), as follows:  In the R Peroneal - EDB study  the take off latency result was increased for Ankle stimulation     The sensory conduction test was normal in all 4 of the tested nerves: R Median - Dig II (Antidromic), R Ulnar - Dig V (Antidromic), R Radial - Superficial (Antidromic), R Sural - (Antidromic).     The F wave study was unremarkable in all 4 of the tested nerves: R Peroneal - EDB, R Tibial - AH, R Median - APB, R Ulnar - ADM     The needle EMG study was normal in all 10 tested muscles: R. Deltoid, R. Triceps brachii, R. Biceps brachii,  R. Extensor digitorum communis, R. First dorsal interosseous, R. Vastus medialis, R. Peroneus longus, R. Tibialis anterior, R. Gastrocnemius, R. Extensor hallucis longus.         Conclusion:   This is a borderline abnormal study due to increased distal motor latency of right common peroneal nerve; this is an isolated finding of unclear etiology but could suggest a chronic lumbosacral radiculopathy.     There is, however, no evidence for an underlying large fiber polyneuropathy, primary demyelinating neuropathy or myopathy or motor neuropathy as clinically questioned.          Impression/ Plan:  Alexus Mendez is a 39 year old who presents for evaluation of 'twitching\" in body; she first noted in July Aug 2023.  She noted some twitching in the tongue for 3 beats and then stops- this was infrequent but progressed to involve arm, leg or other parts of body - she denies issues with speaking / swallowing      She states symptoms first started tongue then R face, lip and then R foot and calf; usually on R more than L side; may note cramping in toes on R or L foot but no sustained contraction othewise; thinks when this first occurred, she had fallen on foot    She showed a video which demonstrated calf fasciculations     Grandmother had Parkinson's disease - age 60s (head twitch); of note, she was previously seen by my neurology associate Dr JORGE De Jesus with extensive workup completed with normal EEG and normal labs but no EMG done.       Patient noted on exam to have brisk deep tendon reflexes but no fasciculations. MRI brain done showed no evidence for demyelinating disease and given symptoms of fasciculations reported, differential includes motor neuron disorder vs benign cramp fasciculation syndrome.        NCS/EMG was completed and demonstrated no evidence for a primary demyelinating neuropathy, only showing isolated prolonged distal motor latency and right common peroneal motor nerve, with no other signs of  demyelination.  This is of unclear etiology but could be related to chronic lumbosacral radiculopathy or to her prior injury to her right leg.     At any rate, there was no evidence for motor neuronopathy /motor neuron disorder at this time.  Were not fitting into a clear category, her twitching could be benign cramp/fasciculation syndrome but this would not explain her apparent hypersensitivity on her face and reported swelling and I agree with evaluation by allergy immunology specialist.     MRI of the cervical spine did not demonstrate suggestion of myelopathy or significant spinal canal stenosis to account for her hyperreflexia.  In addition, there was no suggestion for demyelinating lesions within the cervical spine and previously on MRI of the brain to suggest demyelinating disease.  She has already had B12 level checked in the past, but admits that she changed her diet and stopped eating meat after her B12 level was checked, which was previously in low end of normal.     Theoretically, persistently low B12 level could contribute to some of her symptoms and we will repeat B12 level to evaluate for this possibility.  In terms of potential treatment, given unclear etiology it was mutually agreed that we will hold off on any medications.  She was advised she may have some hypersensitivity or hyper excitability of her nerves and this could be treated with benzodiazepine medications, but side effects may include drowsiness, which patient does not desire at this time; given her twitching has not been painful, and has been brief in occurrence when it occurs, we will hold off on medication at this time.     1. Fasciculations  As noted above   - Vitamin B12; Future    2. Hyper reflexia  As noted above   - Vitamin B12; Future    3. Twitching  As noted above   - Vitamin B12; Future    4. Numbness in feet  As noted above   - Vitamin B12; Future    Return in about 4 months (around 2/2/2025).    30 total minutes spent,  including chart review, discussion of pertinent labs and imaging with patient and family with discussion of differential diagnosis, as well as potential plan of care, as noted above ; patient and family allowed to ask questions and all questions answered to the best of my ability     Roberto Mckenzie MD, Neurology  [facility]  Pager [phone]  10/2/2024

## 2024-12-12 ENCOUNTER — NURSE ONLY (OUTPATIENT)
Dept: ALLERGY | Facility: CLINIC | Age: 39
End: 2024-12-12

## 2024-12-12 ENCOUNTER — LAB ENCOUNTER (OUTPATIENT)
Dept: LAB | Age: 39
End: 2024-12-12
Attending: ALLERGY & IMMUNOLOGY
Payer: COMMERCIAL

## 2024-12-12 ENCOUNTER — OFFICE VISIT (OUTPATIENT)
Dept: ALLERGY | Facility: CLINIC | Age: 39
End: 2024-12-12
Payer: COMMERCIAL

## 2024-12-12 VITALS — HEIGHT: 66 IN | RESPIRATION RATE: 20 BRPM | WEIGHT: 138 LBS | BODY MASS INDEX: 22.18 KG/M2

## 2024-12-12 DIAGNOSIS — Z91.018 FOOD ALLERGY: ICD-10-CM

## 2024-12-12 DIAGNOSIS — L23.5 ALLERGIC DERMATITIS DUE TO OTHER CHEMICAL PRODUCT: Primary | ICD-10-CM

## 2024-12-12 DIAGNOSIS — K90.49 FOOD INTOLERANCE: Primary | ICD-10-CM

## 2024-12-12 DIAGNOSIS — H57.89 EYE SWELLING, BILATERAL: ICD-10-CM

## 2024-12-12 DIAGNOSIS — R14.0 ABDOMINAL BLOATING: ICD-10-CM

## 2024-12-12 DIAGNOSIS — Z23 FLU VACCINE NEED: ICD-10-CM

## 2024-12-12 DIAGNOSIS — R20.0 NUMBNESS IN FEET: ICD-10-CM

## 2024-12-12 DIAGNOSIS — R25.3 FASCICULATIONS: ICD-10-CM

## 2024-12-12 DIAGNOSIS — J30.89 ENVIRONMENTAL AND SEASONAL ALLERGIES: ICD-10-CM

## 2024-12-12 DIAGNOSIS — R25.3 TWITCHING: ICD-10-CM

## 2024-12-12 DIAGNOSIS — Z23 NEED FOR COVID-19 VACCINE: ICD-10-CM

## 2024-12-12 DIAGNOSIS — R29.2 HYPER REFLEXIA: ICD-10-CM

## 2024-12-12 LAB
C4 SERPL-MCNC: 15.2 MG/DL (ref 12–36)
IGA SERPL-MCNC: 135.5 MG/DL (ref 70–312)
VIT B12 SERPL-MCNC: 488 PG/ML (ref 211–911)

## 2024-12-12 PROCEDURE — 3008F BODY MASS INDEX DOCD: CPT | Performed by: ALLERGY & IMMUNOLOGY

## 2024-12-12 PROCEDURE — 95024 IQ TESTS W/ALLERGENIC XTRCS: CPT | Performed by: ALLERGY & IMMUNOLOGY

## 2024-12-12 PROCEDURE — 95004 PERQ TESTS W/ALRGNC XTRCS: CPT | Performed by: ALLERGY & IMMUNOLOGY

## 2024-12-12 PROCEDURE — 82607 VITAMIN B-12: CPT | Performed by: OTHER

## 2024-12-12 PROCEDURE — 99204 OFFICE O/P NEW MOD 45 MIN: CPT | Performed by: ALLERGY & IMMUNOLOGY

## 2024-12-12 RX ORDER — TACROLIMUS 1 MG/G
OINTMENT TOPICAL
Qty: 60 G | Refills: 0 | Status: SHIPPED | OUTPATIENT
Start: 2024-12-12

## 2024-12-12 RX ORDER — LEVOCETIRIZINE DIHYDROCHLORIDE 5 MG/1
5 TABLET, FILM COATED ORAL 2 TIMES DAILY
Qty: 180 TABLET | Refills: 1 | Status: SHIPPED | OUTPATIENT
Start: 2024-12-12

## 2024-12-12 NOTE — PATIENT INSTRUCTIONS
#1 eye swelling  Puffy eyes at times versus more eczematous in nature.  Prior derma tology evaluation and patch testing was positive for CarboMix and true test patch testing per report  Differential includes blepharitis versus contact dermatitis versus eczematous dermatitis  Has tried topical steroids and oral steroids with improvement.  Patient reports unable to use cosmetics at this time.    See above skin testing to screen for allergic triggers  Trial of Xyzal, levocetirizine 5 mg once a day up to 4 times per day if helping  Trial of Protopic 0.1% twice a day if more eczematous in nature.  There is still concern for potential contact dermatitis may consider consultation with Dr. Christian olivia who is a dermatologist who said specializes in contact dermatitis and may have additional panels related to cosmetics which I do not have  check tryptase level  Check C4 level     #2 Food allergy  Concern for abdominal bloating with wheat products  Check skin testing to common food allergens screen for an IgE mediated process  Check celiac panel.  Patient has been gluten-free for approximately 4 to 5 months.  Reviewed with patient that celiac panel can come back negative if avoiding gluten  If need be may have to consider reintroducing gluten for least 4 to 6 weeks and repeating celiac screen if needed  Discussed left rhinitis versus eczematous dermatitis/contact dermatitis FODMAP diet and how sometimes sugars in foods can cause food intolerance symptoms  Please check online for [place] dietary department FODMAP diet    #3 environmental allergies  See above skin testing to screen for environmental triggers.  Reviewed avoidance measures and potential treatment option to therapy  Xyzal 5 mg once a day as an antihistamine if having significant runny nose sneezing itchy watery eyes  May add Flonase or Nasacort if having prominent nasal congestion or postnasal drip    #4 flu vaccine recommended offered.  Patient  deferred    #5 COVID-vaccine booster recommended.  Please check with local pharmacy as we do not stock the vaccine in office               Orders This Visit:  Orders Placed This Encounter   Procedures    Complement C4, Serum    Tryptase    CELIAC DISEASE SCREEN Reflex       Meds This Visit:  Requested Prescriptions     Signed Prescriptions Disp Refills    levocetirizine 5 MG Oral Tab 180 tablet 1     Sig: Take 1 tablet (5 mg total) by mouth in the morning and 1 tablet (5 mg total) before bedtime.    tacrolimus 0.1 % External Ointment 60 g 0     Sig: Apply twice a a day as needed to involved areas

## 2024-12-12 NOTE — PROGRESS NOTES
Alexus Mendez is a 39 year old female.    HPI:     Chief Complaint   Patient presents with    Allergies     New patient.  Patient offers that she has been experiencing bilateral periocular dermatitis and edema for the last year.  Dermatologist did perform a patch test.  Patch test showed an allergy to rubber products.  Patient has been applying topical steroids to eyelids at least once weekly for the last year.  She cannot tolerate any products (make-up, lotions, water) to face.  Patient offers she noticed that [place] milk caused her to flare and experience increased post nasal drip.       Patient is a 39-year-old female who presents for allergy consultation upon referral of her PCP with a chief complaint of allergies      Review of records notes prior dermatology evaluation  with dr buck with prior patch testing on July 3, 2024 being positive to [employer] . True test     Medication list include hydrocortisone 2.5% triamcinolone 0.1%    Prior labs from July 2, 2024 including CBC and TSH reviewed.    No immunizations on record     today patient reports      Allergies  Duration: 1 year ago  Timing: intermittent  Symptoms:Patient offers that she has been experiencing bilateral periocular dermatitis(ad like)  and edema for the last year.  Dermatologist did perform a patch test.  Patch test showed an allergy to rubber products.  Patient has been applying topical steroids to eyelids at least once weekly for the last year.  She cannot tolerate any products (make-up, lotions, water) to face.  Patient offers she noticed that [place] milk caused her to flare and experience increased post nasal drip.    Not using any cosmetics  Using vanicream  Still with eye swelling   Tried avoiding foods. No better   + pnd after eating   No hives   Dx with grovers per pt by  Derm   Severity:moderate   Status: no change   Triggers: allergies?  Tried:topical steroids , allegra,  hc 2.5   Pets: 1 dog   No lip or tongue  swelling  Swelling last day   Better with pred   Using water to clean face  A/w runny nose, pnd   Can get dry and scaly       Sees neuro for twitching     Hx of asthma, ad, or food allergy:  denies     Defers flu vaccine     Gi bloat with glutten , off glutten 6 months   No hives or rashes             HISTORY:  Past Medical History:    Anemia in pregnancy (HCC)    Arrhythmia    SVT;treated with adenosine    Contact dermatitis and other eczema, due to unspecified cause    History of 2019 novel coronavirus disease (COVID-19)    no hospitalization,symptoms: fatigue,fever,cough,headache    Hx of motion sickness    Nonspecific abnormal electrocardiogram (ECG) (EKG)    continues to see cardiologist     Palpitations    SVT (supraventricular tachycardia) (HCC)      Past Surgical History:   Procedure Laterality Date    Other surgical history      minsical tear and cyst removal      Family History   Problem Relation Age of Onset    Cancer Mother 57        lung nonsmoker    Other (cancer lung) Mother     Heart Disease Maternal Grandfather     Other (SVT) Cousin         Maternal Cousin    Breast Cancer Other         mat gma sister      Social History:   Social History     Socioeconomic History    Marital status:    Tobacco Use    Smoking status: Never     Passive exposure: Never    Smokeless tobacco: Never    Tobacco comments:     non-smoker   Vaping Use    Vaping status: Never Used   Substance and Sexual Activity    Alcohol use: Not Currently     Comment: monthly    Drug use: No    Sexual activity: Yes     Partners: Male     Birth control/protection: Vasectomy   Other Topics Concern    Caffeine Concern Yes     Comment: 2 cups a day    Exercise No    Seat Belt Yes     Social Drivers of Health      [facility], [facility]    Social Connections    Received from [facility], [facility]    Housing Stability        Medications (Active  prior to today's visit):  Current Outpatient Medications   Medication Sig Dispense Refill    levocetirizine 5 MG Oral Tab Take 1 tablet (5 mg total) by mouth in the morning and 1 tablet (5 mg total) before bedtime. 180 tablet 1    tacrolimus 0.1 % External Ointment Apply twice a a day as needed to involved areas 60 g 0    hydrocortisone 2.5 % External Ointment Apply 1 Application topically 2 (two) times daily. Apply to eyelid twice daily 30 g 1    Multiple Vitamin (MULTI-VITAMIN DAILY) Oral Tab Take by mouth.      triamcinolone 0.1 % External Cream Apply 1 Application topically 2 (two) times daily. Apply to abdomen twice daily (Patient not taking: Reported on 12/12/2024) 80 g 2       Allergies:  Allergies[1]      ROS:     Allergic/Immuno:  See HPI  Cardiovascular:  Negative for irregular heartbeat/palpitations, chest pain, edema  Constitutional:  Negative night sweats,weight loss, irritability and lethargy  Endocrine:  Negative for cold intolerance, polydipsia and polyphagia  ENMT:  Negative for ear drainage, hearing loss and nasal drainage  Eyes:  Negative for eye discharge and vision loss  Gastrointestinal:  Negative for abdominal pain, diarrhea and vomiting  Genitourinary:  Negative for dysuria and hematuria  Hema/Lymph:  Negative for easy bleeding and easy bruising  Integumentary: see hpi   Musculoskeletal:  Negative for joint symptoms  Neurological:  Negative for dizziness, seizures  Psychiatric:  Negative for inappropriate interaction and psychiatric symptoms  Respiratory:  Negative for cough, dyspnea and wheezing      PHYSICAL EXAM:   Constitutional: responsive, no acute distress noted  Head/Face: NC/Atraumatic  Eyes/Vision: conjunctiva and lids are normal extraocular motion is intact   Ears/Audiometry: tympanic membranes are normal bilaterally hearing is grossly intact  Nose/Mouth/Throat: nose and throat are clear mucous membranes are moist   Neck/Thyroid: neck is supple without adenopathy  Lymphatic: no  abnormal cervical, supraclavicular or axillary adenopathy is noted  Respiratory: normal to inspection lungs are clear to auscultation bilaterally normal respiratory effort   Cardiovascular: regular rate and rhythm no murmurs, gallups, or rubs  Abdomen: soft non-tender non-distended  Skin/Hair: no unusual rashes present  Extremities: no edema, cyanosis, or clubbing  Neurological:Oriented to time, place, person & situation       ASSESSMENT/PLAN:   Assessment   Encounter Diagnoses   Name Primary?    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  Prior derma tology evaluation and patch testing was positive for CarboMix and true test patch testing per report  Differential includes blepharitis versus contact dermatitis versus eczematous dermatitis  Has tried topical steroids and oral steroids with improvement.  Patient reports unable to use cosmetics at this time.    See above skin testing to screen for allergic triggers  Trial of Xyzal, levocetirizine 5 mg once a day up to 4 times per day if helping  Trial of Protopic 0.1% twice a day if more eczematous in nature.  There is still concern for potential contact dermatitis may consider consultation with Dr. Christian olivia who is a dermatologist who said specializes in contact dermatitis and may have additional panels related to cosmetics which I do not have  check tryptase level  Check C4 level     #2 Food allergy  Concern for abdominal bloating with wheat products  Check skin testing to common food allergens screen for an IgE mediated  process  Check celiac panel.  Patient has been gluten-free for approximately 4 to 5 months.  Reviewed with patient that celiac panel can come back negative if avoiding gluten  If need be may have to consider reintroducing gluten for least 4 to 6 weeks and repeating celiac screen if needed  Discussed left rhinitis versus eczematous dermatitis/contact dermatitis FODMAP diet and how sometimes sugars in foods can cause food intolerance symptoms  Please check online for [place] dietary department FODMAP diet    #3 environmental allergies  See above skin testing to screen for environmental triggers.  Reviewed avoidance measures and potential treatment option to therapy  Xyzal 5 mg once a day as an antihistamine if having significant runny nose sneezing itchy watery eyes  May add Flonase or Nasacort if having prominent nasal congestion or postnasal drip    #4 flu vaccine recommended offered.  Patient deferred    #5 COVID-vaccine booster recommended.  Please check with local pharmacy as we do not stock the vaccine in office               Orders This Visit:  Orders Placed This Encounter   Procedures    Complement C4, Serum    Tryptase    CELIAC DISEASE SCREEN Reflex       Meds This Visit:  Requested Prescriptions     Signed Prescriptions Disp Refills    levocetirizine 5 MG Oral Tab 180 tablet 1     Sig: Take 1 tablet (5 mg total) by mouth in the morning and 1 tablet (5 mg total) before bedtime.    tacrolimus 0.1 % External Ointment 60 g 0     Sig: Apply twice a a day as needed to involved areas       Imaging & Referrals:  None     12/12/2024  Marito Hoyos MD      If medication samples were provided today, they were provided solely for patient education and training related to self administration of these medications.  Teaching, instruction and sample was provided to the patient by myself.  Teaching included  a review of potential adverse side effects as well as potential efficacy.  Patient's questions  were answered in regards to medication administration and dosing and potential side effects. Teaching was provided via the teach back method         [1]   Allergies  Allergen Reactions    Adhesive Tape OTHER (SEE COMMENTS)     Skin burning, skin irritation with halter monitor leads especially    Ciprofloxacin OTHER (SEE COMMENTS)     palpitations    Macrobid [Nitrofurantoin] NAUSEA ONLY     Loose stool

## 2024-12-13 LAB — TTG IGA SER-ACNC: <0.2 U/ML (ref ?–7)

## 2024-12-15 LAB — TRYPTASE: 2.3 UG/L

## 2024-12-17 ENCOUNTER — TELEPHONE (OUTPATIENT)
Dept: ALLERGY | Facility: CLINIC | Age: 39
End: 2024-12-17

## 2024-12-17 NOTE — TELEPHONE ENCOUNTER
Patient called Triage line. Patient returning call, unable to transfer to Allergy RN.  Please call Patient back [phone]

## 2024-12-17 NOTE — TELEPHONE ENCOUNTER
----- Message from Marito Hoyos sent at 12/12/2024  2:53 PM CST -----  Please contact patient with normal C4 level 15.2  Total IgA level is normal which is good news.  Celiac panel still pending

## 2024-12-17 NOTE — TELEPHONE ENCOUNTER
----- Message from Marito Hoyos sent at 12/16/2024  7:40 AM CST -----  Please contact patient with normal tryptase level 2.3, negative celiac panel

## 2024-12-17 NOTE — TELEPHONE ENCOUNTER
Pt contacted, confirmed name, and . Pt verbalizes understanding, and denies further questions.     RN discussed Dr. Hoyos's recommendations in last office visit note. Pt will consider more in depth evaluation from Dr. Christian Nieves.

## 2025-01-30 ENCOUNTER — OFFICE VISIT (OUTPATIENT)
Dept: OBGYN CLINIC | Facility: CLINIC | Age: 40
End: 2025-01-30
Payer: COMMERCIAL

## 2025-01-30 VITALS
HEIGHT: 66 IN | HEART RATE: 74 BPM | SYSTOLIC BLOOD PRESSURE: 106 MMHG | BODY MASS INDEX: 21.92 KG/M2 | DIASTOLIC BLOOD PRESSURE: 72 MMHG | WEIGHT: 136.38 LBS

## 2025-01-30 DIAGNOSIS — L65.9 HAIR LOSS: Primary | ICD-10-CM

## 2025-01-30 DIAGNOSIS — N92.6 IRREGULAR MENSES: ICD-10-CM

## 2025-01-30 PROCEDURE — 3078F DIAST BP <80 MM HG: CPT | Performed by: NURSE PRACTITIONER

## 2025-01-30 PROCEDURE — 3008F BODY MASS INDEX DOCD: CPT | Performed by: NURSE PRACTITIONER

## 2025-01-30 PROCEDURE — 3074F SYST BP LT 130 MM HG: CPT | Performed by: NURSE PRACTITIONER

## 2025-01-30 PROCEDURE — 99213 OFFICE O/P EST LOW 20 MIN: CPT | Performed by: NURSE PRACTITIONER

## 2025-01-30 NOTE — PROGRESS NOTES
Subjective:  39 year old    Chief Complaint   Patient presents with    Follow - Up     Patient is still having heavy bleeding     Pt here today, with her partner, with complaints of hair loss and brittle nails  She notes that she is continuing to have heavy menses   Menses starts with spotting, then 3 days of heavy bleeding, followed by spotting  She is concerned that she is anemic 2/2 hair loss and nails    Review of Systems:  Pertinent items are noted in the HPI.    Objective:  /72   Pulse 74   Ht 66\"   Wt 136 lb 6 oz (61.9 kg)   LMP 2025 (Exact Date)       Physical Examination:  General appearance: Well dressed, well nourished in no apparent distress  Neurologic/Psychiatric: Alert and oriented to person, place and time, mood normal, affect appropriate    Assessment/Plan:      Diagnoses and all orders for this visit:    Hair loss  -     CBC W Differential W Platelet; Future  -     Vitamin D; Future  - will treat based on lab results  - if negative, pt may consider follow up with derm    Irregular menses  - treatment discussed  - pt would like to continue to watch bleeding at this time      Return if symptoms worsen or fail to improve.

## 2025-02-04 ENCOUNTER — OFFICE VISIT (OUTPATIENT)
Dept: NEUROLOGY | Facility: CLINIC | Age: 40
End: 2025-02-04
Payer: COMMERCIAL

## 2025-02-04 VITALS
HEART RATE: 106 BPM | BODY MASS INDEX: 21.86 KG/M2 | RESPIRATION RATE: 16 BRPM | DIASTOLIC BLOOD PRESSURE: 54 MMHG | SYSTOLIC BLOOD PRESSURE: 92 MMHG | HEIGHT: 66 IN | WEIGHT: 136 LBS

## 2025-02-04 DIAGNOSIS — R25.3 TWITCHING: Primary | ICD-10-CM

## 2025-02-04 DIAGNOSIS — R25.3 FASCICULATIONS: ICD-10-CM

## 2025-02-04 DIAGNOSIS — R29.2 HYPER REFLEXIA: ICD-10-CM

## 2025-02-04 PROCEDURE — 99213 OFFICE O/P EST LOW 20 MIN: CPT | Performed by: OTHER

## 2025-02-04 PROCEDURE — 3074F SYST BP LT 130 MM HG: CPT | Performed by: OTHER

## 2025-02-04 PROCEDURE — 3008F BODY MASS INDEX DOCD: CPT | Performed by: OTHER

## 2025-02-04 PROCEDURE — 3078F DIAST BP <80 MM HG: CPT | Performed by: OTHER

## 2025-02-04 NOTE — PATIENT INSTRUCTIONS
Refill policies:    Allow 2-3 business days for refills; controlled substances may take longer.  Contact your pharmacy at least 5 days prior to running out of medication and have them send an electronic request or submit request through the “request refill” option in your [employer] account.  Refills are not addressed on weekends; covering physicians do not authorize routine medications on weekends.  No narcotics or controlled substances are refilled after noon on Fridays or by on call physicians.  By law, narcotics must be electronically prescribed.  A 30 day supply with no refills is the maximum allowed.  If your prescription is due for a refill, you may be due for a follow up appointment.  To best provide you care, patients receiving routine medications need to be seen at least once a year.  Patients receiving narcotic/controlled substance medications need to be seen at least once every 3 months.  In the event that your preferred pharmacy does not have the requested medication in stock (e.g. Backordered), it is your responsibility to find another pharmacy that has the requested medication available.  We will gladly send a new prescription to that pharmacy at your request.    Scheduling Tests:    If your physician has ordered radiology tests such as MRI or CT scans, please contact Central Scheduling at [phone] right away to schedule the test.  Once scheduled, the [facility] Centralized Referral Team will work with your insurance carrier to obtain pre-certification or prior authorization.  Depending on your insurance carrier, approval may take 3-10 days.  It is highly recommended patients assure they have received an authorization before having a test performed.  If test is done without insurance authorization, patient may be responsible for the entire amount billed.      Precertification and Prior Authorizations:  If your physician has recommended that you have a procedure or additional testing performed the [facility]  Centralized Referral Team will contact your insurance carrier to obtain pre-certification or prior authorization.    You are strongly encouraged to contact your insurance carrier to verify that your procedure/test has been approved and is a COVERED benefit.  Although the [facility] Centralized Referral Team does its due diligence, the insurance carrier gives the disclaimer that \"Although the procedure is authorized, this does not guarantee payment.\"    Ultimately the patient is responsible for payment.   Thank you for your understanding in this matter.  Paperwork Completion:  If you require FMLA or disability paperwork for your recovery, please make sure to either drop it off or have it faxed to our office at [phone]. Be sure the form has your name and date of birth on it.  The form will be faxed to our Forms Department and they will complete it for you.  There is a 25$ fee for all forms that need to be filled out.  Please be aware there is a 10-14 day turnaround time.  You will need to sign a release of information (ANJANA) form if your paperwork does not come with one.  You may call the Forms Department with any questions at [phone].  Their fax number is [phone].

## 2025-02-04 NOTE — PROGRESS NOTES
[facility] Progress Note    HPI  Chief Complaint   Patient presents with    Neurologic Problem     Fasciculations, reports no change in condition       Initial notes as per 7/2/2024,  \"  Alexus Mendez is a 39 year old female, who presents for evaluation of 'twitching\" in body; she first noted in July Aug 2023.  She noted some twitching in the tongue for 3 beats and then stops- this was infrequent but progressed to involve arm, leg or other parts of body - she denies issues with speaking / swallowing      She states symptoms first started tongue then R face, lip and then R foot and calf; usually on R more than L side; may note cramping in toes on R or L foot but no sustained contraction othewise; thinks when thsi first occurred, she had fallen on foot    She showed a video which demonstrated calf fasciculations     Grandmother had Parkinson's disease - age 60s (head twitch); of note, she was previously seen by my neurology associate Dr JORGE De Jesus with extensive workup completed with normal EEG and normal labs but no EMG done. \"       Prior notes as per 10/2/2024.  Patient since last visit denies any worsening; she continues to feel generally clumsy. She denies swallowing issues but feels she has twitching in face and other parts of her body. She denies pain in eyes or vision loss or double vision but states she may have intermittent \"swelling\" of her eyes especially when she places things near her face. She has not noted a correlation in terms of diet and her symptoms of twitching in the body.  She denies dropping objects from hands and denies any falls or tripping over her feet.  She feels she has had some intermittent cramping in her calves but this has been brief.  She does admit to increased stress over the past year and denies being treated with antianxiety medications in the past.  She is concerned about risk of drowsiness with any medication to treat muscle twitching.  Of note, she  is also planning to see allergy and immunology specialist in the future.        Patient last seen 10/2/2024.  She has continues to have intermittent twitching in various parts of her body but denies sustained cramping - no new numbness/tingling/weakness. She denies any painful cramping and denies tripping over feet in general. She was seen by allergy / immunology for rash around both eyes with overall unremarkable workup.        Past Medical History:    Anemia in pregnancy (HCC)    Arrhythmia    SVT;treated with adenosine    Contact dermatitis and other eczema, due to unspecified cause    History of 2019 novel coronavirus disease (COVID-19)    no hospitalization,symptoms: fatigue,fever,cough,headache    Hx of motion sickness    Nonspecific abnormal electrocardiogram (ECG) (EKG)    continues to see cardiologist     Palpitations    SVT (supraventricular tachycardia) (HCC)     Past Surgical History:   Procedure Laterality Date    Other surgical history      minsical tear and cyst removal     Family History   Problem Relation Age of Onset    Cancer Mother 57        lung nonsmoker    Other (cancer lung) Mother     Heart Disease Maternal Grandfather     Other (SVT) Cousin         Maternal Cousin    Breast Cancer Other         mat gma sister     Social History     Socioeconomic History    Marital status:    Tobacco Use    Smoking status: Never     Passive exposure: Never    Smokeless tobacco: Never    Tobacco comments:     non-smoker   Vaping Use    Vaping status: Never Used   Substance and Sexual Activity    Alcohol use: Not Currently     Comment: monthly    Drug use: No    Sexual activity: Yes     Partners: Male     Birth control/protection: Vasectomy   Other Topics Concern    Caffeine Concern Yes     Comment: 2 cups a day    Exercise No    Seat Belt Yes       Allergies   Allergen Reactions    Adhesive Tape OTHER (SEE COMMENTS)     Skin burning, skin irritation with halter monitor leads especially    Ciprofloxacin  OTHER (SEE COMMENTS)     palpitations    Macrobid [Nitrofurantoin] NAUSEA ONLY     Loose stool         Current Outpatient Medications:     levocetirizine 5 MG Oral Tab, Take 1 tablet (5 mg total) by mouth in the morning and 1 tablet (5 mg total) before bedtime., Disp: 180 tablet, Rfl: 1    tacrolimus 0.1 % External Ointment, Apply twice a a day as needed to involved areas, Disp: 60 g, Rfl: 0    hydrocortisone 2.5 % External Ointment, Apply 1 Application topically 2 (two) times daily. Apply to eyelid twice daily, Disp: 30 g, Rfl: 1    triamcinolone 0.1 % External Cream, Apply 1 Application topically 2 (two) times daily. Apply to abdomen twice daily, Disp: 80 g, Rfl: 2    Multiple Vitamin (MULTI-VITAMIN DAILY) Oral Tab, Take by mouth., Disp: , Rfl:     Review of Systems:  No chest pain or palpitations; otherwise as noted in HPI.    Exam:  BP 92/54 (BP Location: Left arm, Patient Position: Sitting, Cuff Size: adult)   Pulse 106   Resp 16   Ht 66\"   Wt 136 lb (61.7 kg)   LMP 01/30/2025 (Exact Date)   BMI 21.95 kg/m²   Estimated body mass index is 21.95 kg/m² as calculated from the following:    Height as of this encounter: 66\".    Weight as of this encounter: 136 lb (61.7 kg).    Gen: well developed, well nourished, no acute distress  HEENT: normocephalic  Heart; normal S1/S2, regular rate and rhythm  Lungs: clear to auscultation bilaterally  Extremities: no edema, peripheral pulses intact    Neck: supple, full range of motion; no carotid bruits    Fundoscopic Exam: optic discs sharp bilaterally    Neuro:  Mental status:  Orientation: Alert and oriented to person, place, time  Speech Fluent and conversational    CN: PERRL, EOMI with no nystagmus, VFF, smile symmetric, sensation intact, tongue and palate midline, SCM intact, otherwise, CN 2-12 intact  Motor: 5/5 strength throughout, tone normal  DTR: 3+ brisk but symmetric, throughout, toes downgoing bilaterally, no clonus  Sensory: intact to light touch  throughout  Coord: FNF intact with no tremor or dysmetria; rapid alternating movements intact bilaterally  Romberg: absent  Gait: normal casual, heel, toe and tandem gait    Labs:    New    Component      Latest Ref Rng 12/12/2024   Vitamin B12      211 - 911 pg/mL 488        Prior as noted below    Reviewed in EMR  Component      Latest Ref Rng 1/27/2024   Cardiolipin IgG Antibody      <10 GPL 1.4    Cardiolipin IgM Antibody      <10 MPL 3.1    RHEUMATOID FACTOR      <15 IU/mL <10    SED RATE      0 - 20 mm/Hr 1    METHYLMALONIC ACID      0 - 378 nmol/L 188    VITAMIN B1 (THIAMINE), WHOLE B      66.5 - 200.0 nmol/L 91.7    Anti-SSA Antibody, IGG      <7 U/mL <0.4    Anti-SSB Antibody, IGG      <7 U/mL <0.4    VITAMIN B6      3.4 - 65.2 ug/L 16.1    Cardiolipin IgA      0 - 11 APL U/mL <9    Vitamin B12      193 - 986 pg/mL 455    FOLATE (FOLIC ACID), SERUM      >=8.7 ng/mL 11.0          Imaging:  None new    Prior as noted below    MRI cervical spine with and without contrast (9/16/2024):  FINDINGS:    CERVICAL DISC LEVELS:  C2-C3:  No significant disc/facet abnormality, spinal stenosis, or foraminal narrowing.  C3-C4:  No significant disc/facet abnormality, spinal stenosis, or foraminal narrowing.  C4-C5:  No significant disc/facet abnormality, spinal stenosis, or foraminal narrowing.  C5-C6:  Minimal degenerative disc bulge/osteophyte complex without significant central or foraminal stenosis.  The facet joints are unremarkable.  C6-C7:  No significant disc/facet abnormality, spinal stenosis, or foraminal narrowing.  C7-T1:  No significant disc/facet abnormality, spinal stenosis, or foraminal narrowing.          CRANIOCERVICAL AREA:  Normal foramen magnum with no Chiari malformation.    PARASPINAL AREA:  Normal with no visible mass.    BONY STRUCTURES:  No fracture, pars defect, or osseous lesion.    CORD:  Normal caliber, contour and signal intensity.                     Impression   CONCLUSION:  Minimal  degenerative disc disease at C5-6.  Otherwise, unremarkable MRI of the cervical spine.     Independently reviewed: No evidence for significant spinal canal stenosis, or intramedullary signal change, T2/FLAIR hyperintensities within the spinal cord; mild degenerative disc disease not causing significant spinal canal stenosis; no suggestion for demyelinating disease; otherwise agree with report as above;     Reviewed     MRI brain (2/9/2024):     FINDINGS:  The ventricles and sulci are within normal limits.  There is no midline shift or mass effect.  The basal cisterns are patent.  The gray-white matter differentiation is intact.  The craniocervical junction is unremarkable.       Minimal chronic microvascular ischemic changes in the cerebral white matter.     There is no acute intracranial hemorrhage or extra-axial fluid collection identified.  No significant abnormal parenchymal gradient susceptibility. There is no restricted diffusion to suggest acute ischemia/infarction.     The visualized paranasal sinuses and mastoid air cells are unremarkable.  The expected major intracranial flow voids are present.                      Impression   CONCLUSION:       1. No acute intracranial abnormality identified.     2. Minimal chronic microvascular ischemic changes in the cerebral white matter.     Other procedures    None new    Prior as noted below    NCS/EMG (8/12/2024):     Sensory NCS      Nerve / Sites Rec. Site Onset Lat Peak Lat NP Amp PP Amp Segments Distance Velocity Comment       ms ms µV µV   cm m/s     R Median - Dig II (Antidromic)      Wrist Index 2.60 3.59 60.5 107.2 Wrist - Index 13 50        Ref.   <=3.30 <=4.00 >=17.0 >=19.0 Ref.         R Ulnar - Dig V (Antidromic)      Wrist Dig V 2.34 3.23 60.0 83.8 Wrist - Dig V 12 51        Ref.   <=3.10 <=4.00 >=14.0 >=13.0 Ref.         R Radial - Superficial (Antidromic)      Forearm Wrist 1.56 2.40 54.7 52.2 Forearm - Wrist 10 64        Ref.   <=2.20 <=2.80 >=7.0  >=11.0 Ref.         R Sural - (Antidromic)      Calf Ankle 2.97 3.75 16.0 22.4 Calf - Ankle 14 47        Ref.   <=3.60 <=4.50 >=4.0 >=4.0 Ref.             Motor NCS      Nerve / Sites Muscle Latency Amplitude Segments Dist. Lat Diff Velocity Comments       ms mV   cm ms m/s     R Median - APB      Wrist APB 3.13 8.6 Wrist - APB 7            Ref.   <=4.40 >=4.2 Ref.              Elbow APB 7.02 9.2 Elbow - Wrist 23.5 3.90 60.3        Ref.       Ref.     >=51.0     R Ulnar - ADM      Wrist ADM 2.77 12.7 Wrist - ADM 7            Ref.   <=3.70 >=7.9 Ref.              B.Elbow ADM 6.25 11.9 B.Elbow - Wrist 20.5 3.48 58.9        Ref.       Ref.     >=52.0     R Peroneal - EDB      Ankle EDB 7.65 2.1 Ankle - EDB 7            Ref.   <=6.50 >=1.1 Ref.              B. Fib Head EDB 15.10 2.0 B. Fib Head - Ankle 32 7.46 42.9        Ref.       Ref.     >=39.0     R Tibial - AH      Ankle AH 4.38 20.0 Ankle - AH 8            Ref.   <=6.10 >=5.3 Ref.              Knee AH 12.54 15.4 Knee - Ankle 38 8.17 46.5        Ref.       Ref.     >=42.0         F  Wave      Nerve M Latency F Latency     ms ms   R Peroneal - EDB 8.4 54.2   Ref.   <=58.8   R Tibial - AH 4.9 47.8   Ref.   <=57.5   R Median - APB 3.5 25.6   Ref.   <=28.5   R Ulnar - ADM 3.2 26.4   Ref.   <=30.2                   EMG Summary Table       Spontaneous MUAP Recruitment   Muscle IA Fib PSW Fasc H.F. Amp Dur. PPP Pattern   R. Deltoid N None None None None N N N N   R. Triceps brachii N None None None None N N N N   R. Biceps brachii N None None None None N N N N   R. Extensor digitorum communis N None None None None N N N N   R. First dorsal interosseous N None None None None N N N N   R. Vastus medialis N None None None None N N N N   R. Peroneus longus N None None None None N N N N   R. Tibialis anterior N None None None None N N N N   R. Gastrocnemius N None None None None N N N N   R. Extensor hallucis longus N None None None None N N N N       Summary     The motor  conduction test was performed on 4 nerve(s). The results were normal in 3 nerve(s): R Median - APB, R Ulnar - ADM, R Tibial - AH. Results outside the specified normal range were found in 1 nerve(s), as follows:  In the R Peroneal - EDB study  the take off latency result was increased for Ankle stimulation     The sensory conduction test was normal in all 4 of the tested nerves: R Median - Dig II (Antidromic), R Ulnar - Dig V (Antidromic), R Radial - Superficial (Antidromic), R Sural - (Antidromic).     The F wave study was unremarkable in all 4 of the tested nerves: R Peroneal - EDB, R Tibial - AH, R Median - APB, R Ulnar - ADM     The needle EMG study was normal in all 10 tested muscles: R. Deltoid, R. Triceps brachii, R. Biceps brachii, R. Extensor digitorum communis, R. First dorsal interosseous, R. Vastus medialis, R. Peroneus longus, R. Tibialis anterior, R. Gastrocnemius, R. Extensor hallucis longus.         Conclusion:   This is a borderline abnormal study due to increased distal motor latency of right common peroneal nerve; this is an isolated finding of unclear etiology but could suggest a chronic lumbosacral radiculopathy.     There is, however, no evidence for an underlying large fiber polyneuropathy, primary demyelinating neuropathy or myopathy or motor neuropathy as clinically questioned.          Impression/ Plan:  Alexus Mendez is a 39 year old who presents for evaluation of 'twitching\" in body; she first noted in July Aug 2023.  She noted some twitching in the tongue for 3 beats and then stops- this was infrequent but progressed to involve arm, leg or other parts of body - she denies issues with speaking / swallowing      She states symptoms first started tongue then R face, lip and then R foot and calf; usually on R more than L side; may note cramping in toes on R or L foot but no sustained contraction othewise; thinks when this first occurred, she had fallen on foot    She showed a video  which demonstrated calf fasciculations     Grandmother had Parkinson's disease - age 60s (head twitch); of note, she was previously seen by my neurology associate Dr JORGE De Jesus with extensive workup completed with normal EEG and normal labs but no EMG done.       Patient noted on exam to have brisk deep tendon reflexes but no fasciculations. MRI brain done showed no evidence for demyelinating disease and given symptoms of fasciculations reported, differential includes motor neuron disorder vs benign cramp fasciculation syndrome.        NCS/EMG was completed and demonstrated no evidence for a primary demyelinating neuropathy, only showing isolated prolonged distal motor latency and right common peroneal motor nerve, with no other signs of demyelination.  This is of unclear etiology but could be related to chronic lumbosacral radiculopathy or to her prior injury to her right leg.     At any rate, there was no evidence for motor neuronopathy /motor neuron disorder at this time.  While not fitting into a clear category, her twitching could be benign cramp/fasciculation syndrome.    MRI of the cervical spine did not demonstrate suggestion of myelopathy or significant spinal canal stenosis to account for her hyperreflexia.  In addition, there was no suggestion for demyelinating lesions within the cervical spine and previously on MRI of the brain to suggest demyelinating disease.  She has already had B12 level checked in the past, but admits that she changed her diet and stopped eating meat after her B12 level was checked, which was previously in low end of normal- this was repeated and normal at this time     Theoretically, persistently low magnesium or calcium could lead to nerve hyper-excitability and will check CMP and Magnesium level.     In terms of potential treatment, given unclear etiology it was mutually agreed that we will hold off on any medications.  She was advised she may have some hypersensitivity or hyper  excitability of her nerves and this could be treated with benzodiazepine medications, but side effects may include drowsiness, which patient does not desire at this time; given her twitching has not been painful, and has been brief in occurrence when it occurs, we will hold off on medication at this time.     1. Fasciculations  As noted above   - Comp Metabolic Panel (14); Future  - Magnesium; Future    2. Twitching  As noted above   - Comp Metabolic Panel (14); Future  - Magnesium; Future    3. Hyper reflexia  As noted above   - Comp Metabolic Panel (14); Future  - Magnesium; Future    Return in about 6 months (around 8/4/2025).    Roberto Mckenzie MD, Neurology  [facility]  Pager [phone]  2/4/2025

## 2025-02-05 ENCOUNTER — LAB ENCOUNTER (OUTPATIENT)
Dept: LAB | Age: 40
End: 2025-02-05
Attending: Other
Payer: COMMERCIAL

## 2025-02-05 DIAGNOSIS — R25.3 TWITCHING: ICD-10-CM

## 2025-02-05 DIAGNOSIS — R29.2 HYPER REFLEXIA: ICD-10-CM

## 2025-02-05 DIAGNOSIS — R25.3 FASCICULATIONS: ICD-10-CM

## 2025-02-05 DIAGNOSIS — L65.9 HAIR LOSS: ICD-10-CM

## 2025-02-05 LAB
ALBUMIN SERPL-MCNC: 4.4 G/DL (ref 3.2–4.8)
ALBUMIN/GLOB SERPL: 1.8 {RATIO} (ref 1–2)
ALP LIVER SERPL-CCNC: 45 U/L
ALT SERPL-CCNC: 13 U/L
ANION GAP SERPL CALC-SCNC: 8 MMOL/L (ref 0–18)
AST SERPL-CCNC: 19 U/L (ref ?–34)
BASOPHILS # BLD AUTO: 0.03 X10(3) UL (ref 0–0.2)
BASOPHILS NFR BLD AUTO: 0.7 %
BILIRUB SERPL-MCNC: 1.1 MG/DL (ref 0.3–1.2)
BUN BLD-MCNC: 17 MG/DL (ref 9–23)
CALCIUM BLD-MCNC: 9.3 MG/DL (ref 8.7–10.6)
CHLORIDE SERPL-SCNC: 105 MMOL/L (ref 98–112)
CO2 SERPL-SCNC: 27 MMOL/L (ref 21–32)
CREAT BLD-MCNC: 0.97 MG/DL
EGFRCR SERPLBLD CKD-EPI 2021: 76 ML/MIN/1.73M2 (ref 60–?)
EOSINOPHIL # BLD AUTO: 0.11 X10(3) UL (ref 0–0.7)
EOSINOPHIL NFR BLD AUTO: 2.7 %
ERYTHROCYTE [DISTWIDTH] IN BLOOD BY AUTOMATED COUNT: 13.9 %
FASTING STATUS PATIENT QL REPORTED: YES
GLOBULIN PLAS-MCNC: 2.4 G/DL (ref 2–3.5)
GLUCOSE BLD-MCNC: 90 MG/DL (ref 70–99)
HCT VFR BLD AUTO: 41.1 %
HGB BLD-MCNC: 13.7 G/DL
IMM GRANULOCYTES # BLD AUTO: 0.01 X10(3) UL (ref 0–1)
IMM GRANULOCYTES NFR BLD: 0.2 %
LYMPHOCYTES # BLD AUTO: 1.73 X10(3) UL (ref 1–4)
LYMPHOCYTES NFR BLD AUTO: 42.3 %
MAGNESIUM SERPL-MCNC: 1.9 MG/DL (ref 1.6–2.6)
MCH RBC QN AUTO: 30.1 PG (ref 26–34)
MCHC RBC AUTO-ENTMCNC: 33.3 G/DL (ref 31–37)
MCV RBC AUTO: 90.3 FL
MONOCYTES # BLD AUTO: 0.38 X10(3) UL (ref 0.1–1)
MONOCYTES NFR BLD AUTO: 9.3 %
NEUTROPHILS # BLD AUTO: 1.83 X10 (3) UL (ref 1.5–7.7)
NEUTROPHILS # BLD AUTO: 1.83 X10(3) UL (ref 1.5–7.7)
NEUTROPHILS NFR BLD AUTO: 44.8 %
OSMOLALITY SERPL CALC.SUM OF ELEC: 291 MOSM/KG (ref 275–295)
PLATELET # BLD AUTO: 188 10(3)UL (ref 150–450)
POTASSIUM SERPL-SCNC: 4.9 MMOL/L (ref 3.5–5.1)
PROT SERPL-MCNC: 6.8 G/DL (ref 5.7–8.2)
RBC # BLD AUTO: 4.55 X10(6)UL
SODIUM SERPL-SCNC: 140 MMOL/L (ref 136–145)
VIT D+METAB SERPL-MCNC: 29.1 NG/ML (ref 30–100)
WBC # BLD AUTO: 4.1 X10(3) UL (ref 4–11)

## 2025-02-05 PROCEDURE — 85025 COMPLETE CBC W/AUTO DIFF WBC: CPT | Performed by: NURSE PRACTITIONER

## 2025-02-05 PROCEDURE — 83735 ASSAY OF MAGNESIUM: CPT | Performed by: OTHER

## 2025-02-05 PROCEDURE — 82306 VITAMIN D 25 HYDROXY: CPT | Performed by: NURSE PRACTITIONER

## 2025-02-05 PROCEDURE — 80053 COMPREHEN METABOLIC PANEL: CPT | Performed by: OTHER

## 2025-02-06 NOTE — PROGRESS NOTES
Called and spoke with pt. And reviewed test results and recommendations from [facility] for supplement.  Pt. Verbalizes understanding and agrees to plan.

## 2025-08-05 ENCOUNTER — LAB ENCOUNTER (OUTPATIENT)
Dept: LAB | Age: 40
End: 2025-08-05
Attending: FAMILY MEDICINE

## 2025-08-05 ENCOUNTER — OFFICE VISIT (OUTPATIENT)
Dept: FAMILY MEDICINE CLINIC | Facility: CLINIC | Age: 40
End: 2025-08-05

## 2025-08-05 VITALS
BODY MASS INDEX: 20.57 KG/M2 | HEIGHT: 66 IN | RESPIRATION RATE: 18 BRPM | SYSTOLIC BLOOD PRESSURE: 98 MMHG | OXYGEN SATURATION: 99 % | HEART RATE: 68 BPM | TEMPERATURE: 97 F | WEIGHT: 128 LBS | DIASTOLIC BLOOD PRESSURE: 80 MMHG

## 2025-08-05 DIAGNOSIS — Z12.31 VISIT FOR SCREENING MAMMOGRAM: ICD-10-CM

## 2025-08-05 DIAGNOSIS — G51.4 FACIAL TWITCHING: ICD-10-CM

## 2025-08-05 DIAGNOSIS — E55.9 VITAMIN D DEFICIENCY: ICD-10-CM

## 2025-08-05 DIAGNOSIS — Z00.00 ANNUAL PHYSICAL EXAM: Primary | ICD-10-CM

## 2025-08-05 DIAGNOSIS — K62.5 RECTAL BLEEDING: ICD-10-CM

## 2025-08-05 DIAGNOSIS — Z00.00 ANNUAL PHYSICAL EXAM: ICD-10-CM

## 2025-08-05 LAB
ALBUMIN SERPL-MCNC: 4.4 G/DL (ref 3.2–4.8)
ALBUMIN/GLOB SERPL: 1.8 (ref 1–2)
ALP LIVER SERPL-CCNC: 44 U/L (ref 37–98)
ALT SERPL-CCNC: 12 U/L (ref 10–49)
ANION GAP SERPL CALC-SCNC: 5 MMOL/L (ref 0–18)
AST SERPL-CCNC: 20 U/L (ref ?–34)
BILIRUB SERPL-MCNC: 1.8 MG/DL (ref 0.3–1.2)
BUN BLD-MCNC: 13 MG/DL (ref 9–23)
CALCIUM BLD-MCNC: 8.9 MG/DL (ref 8.7–10.6)
CHLORIDE SERPL-SCNC: 106 MMOL/L (ref 98–112)
CHOLEST SERPL-MCNC: 134 MG/DL (ref ?–200)
CO2 SERPL-SCNC: 31 MMOL/L (ref 21–32)
CREAT BLD-MCNC: 0.99 MG/DL (ref 0.55–1.02)
EGFRCR SERPLBLD CKD-EPI 2021: 73 ML/MIN/1.73M2 (ref 60–?)
ERYTHROCYTE [DISTWIDTH] IN BLOOD BY AUTOMATED COUNT: 13.4 %
EST. AVERAGE GLUCOSE BLD GHB EST-MCNC: 71 MG/DL (ref 68–126)
FASTING PATIENT LIPID ANSWER: YES
FASTING STATUS PATIENT QL REPORTED: YES
GLOBULIN PLAS-MCNC: 2.5 G/DL (ref 2–3.5)
GLUCOSE BLD-MCNC: 94 MG/DL (ref 70–99)
HBA1C MFR BLD: 4.1 % (ref ?–5.7)
HCT VFR BLD AUTO: 37.9 % (ref 35–48)
HDLC SERPL-MCNC: 52 MG/DL (ref 40–59)
HGB BLD-MCNC: 13.1 G/DL (ref 12–16)
LDLC SERPL CALC-MCNC: 63 MG/DL (ref ?–100)
MCH RBC QN AUTO: 29.7 PG (ref 26–34)
MCHC RBC AUTO-ENTMCNC: 34.6 G/DL (ref 31–37)
MCV RBC AUTO: 85.9 FL (ref 80–100)
NONHDLC SERPL-MCNC: 82 MG/DL (ref ?–130)
OSMOLALITY SERPL CALC.SUM OF ELEC: 294 MOSM/KG (ref 275–295)
PLATELET # BLD AUTO: 191 10(3)UL (ref 150–450)
POTASSIUM SERPL-SCNC: 4.6 MMOL/L (ref 3.5–5.1)
PROT SERPL-MCNC: 6.9 G/DL (ref 5.7–8.2)
RBC # BLD AUTO: 4.41 X10(6)UL (ref 3.8–5.3)
SODIUM SERPL-SCNC: 142 MMOL/L (ref 136–145)
TRIGL SERPL-MCNC: 101 MG/DL (ref 30–149)
TSI SER-ACNC: 1.02 UIU/ML (ref 0.55–4.78)
VIT D+METAB SERPL-MCNC: 35.1 NG/ML (ref 30–100)
VLDLC SERPL CALC-MCNC: 15 MG/DL (ref 0–30)
WBC # BLD AUTO: 4.7 X10(3) UL (ref 4–11)

## 2025-08-05 PROCEDURE — 85027 COMPLETE CBC AUTOMATED: CPT | Performed by: FAMILY MEDICINE

## 2025-08-05 PROCEDURE — 84443 ASSAY THYROID STIM HORMONE: CPT | Performed by: FAMILY MEDICINE

## 2025-08-05 PROCEDURE — 80053 COMPREHEN METABOLIC PANEL: CPT | Performed by: FAMILY MEDICINE

## 2025-08-05 PROCEDURE — 3079F DIAST BP 80-89 MM HG: CPT | Performed by: FAMILY MEDICINE

## 2025-08-05 PROCEDURE — 80061 LIPID PANEL: CPT | Performed by: FAMILY MEDICINE

## 2025-08-05 PROCEDURE — 83036 HEMOGLOBIN GLYCOSYLATED A1C: CPT | Performed by: FAMILY MEDICINE

## 2025-08-05 PROCEDURE — 82306 VITAMIN D 25 HYDROXY: CPT | Performed by: FAMILY MEDICINE

## 2025-08-05 PROCEDURE — 99396 PREV VISIT EST AGE 40-64: CPT | Performed by: FAMILY MEDICINE

## 2025-08-05 PROCEDURE — 3074F SYST BP LT 130 MM HG: CPT | Performed by: FAMILY MEDICINE

## 2025-08-05 PROCEDURE — 3008F BODY MASS INDEX DOCD: CPT | Performed by: FAMILY MEDICINE

## 2025-08-08 ENCOUNTER — LAB ENCOUNTER (OUTPATIENT)
Dept: LAB | Age: 40
End: 2025-08-08
Attending: INTERNAL MEDICINE

## 2025-08-08 DIAGNOSIS — K90.41 GLUTEN INTOLERANCE: Primary | ICD-10-CM

## 2025-08-08 LAB
BILIRUB DIRECT SERPL-MCNC: 0.3 MG/DL (ref ?–0.3)
BILIRUB SERPL-MCNC: 0.9 MG/DL (ref 0.3–1.2)

## 2025-08-08 PROCEDURE — 36415 COLL VENOUS BLD VENIPUNCTURE: CPT

## 2025-08-08 PROCEDURE — 82247 BILIRUBIN TOTAL: CPT

## 2025-08-08 PROCEDURE — 82248 BILIRUBIN DIRECT: CPT

## 2025-08-08 PROCEDURE — 81377 HLA II TYPE 1 AG EQUIV LR: CPT

## 2025-08-20 LAB
DQ2 (DQA1 0501/0505,DQB1 02XX): NEGATIVE
DQ8 (DQA1 03XX, DQB1 0302): NEGATIVE

## (undated) DEVICE — SOLUTION  .9 3000ML

## (undated) DEVICE — DEV REMOVAL TRUCLEAR SFT MINI

## (undated) DEVICE — SEAL TRUCLEAR  HYSTERSCOP

## (undated) DEVICE — UNDYED BRAIDED (POLYGLACTIN 910), SYNTHETIC ABSORBABLE SUTURE: Brand: COATED VICRYL

## (undated) DEVICE — MEDI-VAC NON-CONDUCTIVE SUCTION TUBING: Brand: CARDINAL HEALTH

## (undated) DEVICE — 2000CC GUARDIAN II: Brand: GUARDIAN

## (undated) DEVICE — OUTFLOW HYSTER S&N

## (undated) DEVICE — SPECIMEN SOCK - STANDARD: Brand: MEDI-VAC

## (undated) DEVICE — STERILE POLYISOPRENE POWDER-FREE SURGICAL GLOVES: Brand: PROTEXIS

## (undated) DEVICE — SLEEVE KENDALL SCD EXPRESS MED

## (undated) DEVICE — INFLOWHYSTER S&N

## (undated) DEVICE — GYN CDS: Brand: MEDLINE INDUSTRIES, INC.

## (undated) DEVICE — SOLUTION  .9 1000ML BTL

## (undated) NOTE — ED AVS SNAPSHOT
[facility] in [address] [address]    Phone:  [phone]    Fax:  [phone]           Brad Pal   MRN: [id number]    Department:  [facility]   Date of Visit:  4/26/2017 Alternate Tylenol with Ibuprofen every 3 hours as needed for pain or fever. See your doctor in 3-5 days if not better.   Rx Augmentin 875 mg twice daily X 10 days   Take daily probiotics while on the antibiotic to avoid any GI distress from the antibiotic a substitute for ongoing medical care. Often, one Immediate Care visit does not uncover every injury or illness.  If you have been referred to a primary care or a specialist physician for a follow-up visit, please tell this physician (or your personal docto Mary Valdivia [address] [facility] [address]) [phone]                Additional Information       We are concerned for your overall well being:    - If you are a smoker or have smoked in the last 12 months, we encourage you to explore options for MARGA VÁSQUEZ [facility]

## (undated) NOTE — MR AVS SNAPSHOT
After Visit Summary   3/19/2021    Yo Jones    MRN: [id number]           Visit Information     Date & Time  3/19/2021  9:00 AM Provider  [facility] [place] Department  [facility], [address] [place], [place] Dept.  Phone  [phone] [facility] [facility] Scheduling at [phone].           Instructions    Evening Primrose    Chasteberry (Vitex)    Make sure cardiology is OK with the herbs    Vit D3, [id number] daily    Magnesium Glycinate 400 nightly                     DMG now offe available by appointment     Average cost  $70*       [address]   Monday – Friday  10:00 am – 10:00 pm   Saturday – Sunday  10:00 am – 4:00 pm     [employer] (formerly [employer])   Monday – Frid

## (undated) NOTE — LETTER
Patient Name: Masoud April  Surgery Date: 10/15/2022  CSN: [id number]  Medical Record: [id number]     Surgeon(s):  Yordy Montes MD  Consent Procedure: Jagruti Noguera HYSTEROSCOPY POLYPECTOMY, dilation and curettage, removal of right vulvar skin tag  Anesthesia Type: Choice    Please fax last office note and EKG for comparison due to history of Supraventricular tachycardia    Sincerely,  ANUM Nicolas  PreAdmission Testing

## (undated) NOTE — MR AVS SNAPSHOT
[address] [place] [id number] [phone]               Thank you for choosing us for your health care visit with Caro Mane DO.   We are glad to serve you and happy to provide you with this summary of your vi [facility] INTERNAL [[id number] CUSTOM]  Order #:  [id number]         **REFERRAL REQUEST**    Your physician has referred you to a specialist.  Your physician or the clinic staff will provide you with the phone number you should call to schedule your appointment Summaries. If you've been to the Emergency Department or your doctor's office, you can view your past visit information in Etable by going to Visits < Visit Summaries. Etable questions? Call [phone] for help.   Etable is NOT to be used for urge

## (undated) NOTE — MR AVS SNAPSHOT
After Visit Summary   2/14/2024    Alexus Mendez   MRN: [id number]           Visit Information     Date & Time  2/14/2024  9:30 AM Provider  [id number] Department  [facility] EEG Dept. Phone  [phone]      Your Vitals Were     LMP    (LMP Unknown)             Allergies as of 2/14/2024  Review status set to Review Complete on 1/25/2024       Noted Reaction Type Reactions    Adhesive Tape 10/11/2022   Systemic OTHER (SEE COMMENTS)    Skin burning, skin irritation with halter monitor leads especially    Ciprofloxacin 10/11/2022   Systemic OTHER (SEE COMMENTS)    palpitations    Macrobid [nitrofurantoin] 10/31/2017   Systemic NAUSEA ONLY    Loose stool      Your Current Medications        Dosage    ergocalciferol 1.25 MG (70488 UT) Oral Cap Take 1 capsule (50,000 Units total) by mouth every 7 days.    hydrocortisone 2.5 % External Ointment Apply 1 Application topically 2 (two) times daily. Apply to eyelid twice daily    triamcinolone 0.1 % External Cream Apply 1 Application topically 2 (two) times daily. Apply to abdomen twice daily    Multiple Vitamin (MULTI-VITAMIN DAILY) Oral Tab Take by mouth.      Diagnoses for This Visit    Twitching   [271639]             We Ordered the Following     Normal Orders This Visit    EEG (At [facility]) [NEU4 CUSTOM]       Future Appointments        Provider Department    3/7/2024 3:40 PM Herman De Jesus [facility], [address] [place] [address]                Did you know that [facility] primary care physicians now offer Video Visits through [employer] for adult patients for a variety of conditions such as allergies, back pain and cold symptoms? Skip the drive and waiting room and online chat with a doctor face-to-face using your web-cam enabled computer or mobile device wherever you are. Video Visits cost $50 and can be paid hassle-free using a credit, debit, or health savings card.  Not active on [employer]? Ask us how to get signed up today!          If  you receive a survey from [employer], please take a few minutes to complete it and provide feedback. We strive to deliver the best patient experience and are looking for ways to make improvements. Your feedback will help us do so. For more information on [employer], please visit [email or web address]           No text in SmartText           No text in SmartText

## (undated) NOTE — Clinical Note
Get Patton - I saw Markie Radish with pelvic floor dysfunction. I'm going to start with pelvic floor PT and see her back to assess her symptoms. I appreciate the opportunity to participate in her care. Thanks!  Imelda Gutierrez